# Patient Record
Sex: FEMALE | Race: WHITE | Employment: OTHER | URBAN - METROPOLITAN AREA
[De-identification: names, ages, dates, MRNs, and addresses within clinical notes are randomized per-mention and may not be internally consistent; named-entity substitution may affect disease eponyms.]

---

## 2021-10-21 ENCOUNTER — HOSPITAL ENCOUNTER (OUTPATIENT)
Dept: PREADMISSION TESTING | Age: 66
Discharge: HOME | End: 2021-10-25
Payer: COMMERCIAL

## 2021-10-21 VITALS
WEIGHT: 196 LBS | HEART RATE: 57 BPM | BODY MASS INDEX: 36.07 KG/M2 | SYSTOLIC BLOOD PRESSURE: 153 MMHG | HEIGHT: 62 IN | RESPIRATION RATE: 16 BRPM | DIASTOLIC BLOOD PRESSURE: 83 MMHG | OXYGEN SATURATION: 98 % | TEMPERATURE: 97.2 F

## 2021-10-21 LAB
-: NORMAL
ABO/RH: NORMAL
AMORPHOUS: NORMAL
ANION GAP SERPL CALCULATED.3IONS-SCNC: 11 MMOL/L (ref 9–17)
ANTIBODY SCREEN: NEGATIVE
ARM BAND NUMBER: NORMAL
BACTERIA: NORMAL
BILIRUBIN URINE: NEGATIVE
BUN BLDV-MCNC: 7 MG/DL (ref 8–23)
BUN/CREAT BLD: 13 (ref 9–20)
CALCIUM SERPL-MCNC: 9.2 MG/DL (ref 8.6–10.4)
CASTS UA: NORMAL /LPF
CHLORIDE BLD-SCNC: 100 MMOL/L (ref 98–107)
CO2: 29 MMOL/L (ref 20–31)
COLOR: YELLOW
COMMENT UA: ABNORMAL
CREAT SERPL-MCNC: 0.52 MG/DL (ref 0.5–0.9)
CRYSTALS, UA: NORMAL /HPF
EKG ATRIAL RATE: 57 BPM
EKG P AXIS: 79 DEGREES
EKG P-R INTERVAL: 164 MS
EKG Q-T INTERVAL: 492 MS
EKG QRS DURATION: 82 MS
EKG QTC CALCULATION (BAZETT): 478 MS
EKG R AXIS: 50 DEGREES
EKG T AXIS: 60 DEGREES
EKG VENTRICULAR RATE: 57 BPM
EPITHELIAL CELLS UA: NORMAL /HPF (ref 0–5)
EXPIRATION DATE: NORMAL
GFR AFRICAN AMERICAN: >60 ML/MIN
GFR NON-AFRICAN AMERICAN: >60 ML/MIN
GFR SERPL CREATININE-BSD FRML MDRD: ABNORMAL ML/MIN/{1.73_M2}
GFR SERPL CREATININE-BSD FRML MDRD: ABNORMAL ML/MIN/{1.73_M2}
GLUCOSE BLD-MCNC: 100 MG/DL (ref 70–99)
GLUCOSE URINE: NEGATIVE
HCT VFR BLD CALC: 46.8 % (ref 36.3–47.1)
HEMOGLOBIN: 15.2 G/DL (ref 11.9–15.1)
KETONES, URINE: NEGATIVE
LEUKOCYTE ESTERASE, URINE: ABNORMAL
MCH RBC QN AUTO: 33.2 PG (ref 25.2–33.5)
MCHC RBC AUTO-ENTMCNC: 32.5 G/DL (ref 28.4–34.8)
MCV RBC AUTO: 102.2 FL (ref 82.6–102.9)
MUCUS: NORMAL
NITRITE, URINE: NEGATIVE
NRBC AUTOMATED: 0 PER 100 WBC
OTHER OBSERVATIONS UA: NORMAL
PDW BLD-RTO: 12.2 % (ref 11.8–14.4)
PH UA: 6 (ref 5–8)
PLATELET # BLD: 289 K/UL (ref 138–453)
PMV BLD AUTO: 9.3 FL (ref 8.1–13.5)
POTASSIUM SERPL-SCNC: 4.7 MMOL/L (ref 3.7–5.3)
PROTEIN UA: NEGATIVE
RBC # BLD: 4.58 M/UL (ref 3.95–5.11)
RBC UA: NORMAL /HPF (ref 0–2)
RENAL EPITHELIAL, UA: NORMAL /HPF
SEDIMENTATION RATE, ERYTHROCYTE: <1 MM (ref 0–30)
SODIUM BLD-SCNC: 140 MMOL/L (ref 135–144)
SPECIFIC GRAVITY UA: 1.02 (ref 1–1.03)
TRICHOMONAS: NORMAL
TURBIDITY: ABNORMAL
URINE HGB: NEGATIVE
UROBILINOGEN, URINE: NORMAL
WBC # BLD: 5.9 K/UL (ref 3.5–11.3)
WBC UA: NORMAL /HPF (ref 0–5)
YEAST: NORMAL

## 2021-10-21 PROCEDURE — 87641 MR-STAPH DNA AMP PROBE: CPT

## 2021-10-21 PROCEDURE — 85027 COMPLETE CBC AUTOMATED: CPT

## 2021-10-21 PROCEDURE — 86850 RBC ANTIBODY SCREEN: CPT

## 2021-10-21 PROCEDURE — 86901 BLOOD TYPING SEROLOGIC RH(D): CPT

## 2021-10-21 PROCEDURE — 87086 URINE CULTURE/COLONY COUNT: CPT

## 2021-10-21 PROCEDURE — 93010 ELECTROCARDIOGRAM REPORT: CPT | Performed by: INTERNAL MEDICINE

## 2021-10-21 PROCEDURE — 93005 ELECTROCARDIOGRAM TRACING: CPT | Performed by: ORTHOPAEDIC SURGERY

## 2021-10-21 PROCEDURE — 85652 RBC SED RATE AUTOMATED: CPT

## 2021-10-21 PROCEDURE — 81001 URINALYSIS AUTO W/SCOPE: CPT

## 2021-10-21 PROCEDURE — 80048 BASIC METABOLIC PNL TOTAL CA: CPT

## 2021-10-21 PROCEDURE — 86900 BLOOD TYPING SEROLOGIC ABO: CPT

## 2021-10-21 PROCEDURE — 36415 COLL VENOUS BLD VENIPUNCTURE: CPT

## 2021-10-21 RX ORDER — FUROSEMIDE 20 MG/1
20 TABLET ORAL DAILY PRN
COMMUNITY
Start: 2021-10-05

## 2021-10-21 RX ORDER — SOTALOL HYDROCHLORIDE 120 MG/1
120 TABLET ORAL 2 TIMES DAILY
COMMUNITY
Start: 2021-10-20

## 2021-10-21 RX ORDER — BUPROPION HYDROCHLORIDE 150 MG/1
150 TABLET ORAL EVERY MORNING
COMMUNITY
Start: 2021-10-06

## 2021-10-21 RX ORDER — GABAPENTIN 300 MG/1
300 CAPSULE ORAL ONCE
Status: CANCELLED | OUTPATIENT
Start: 2021-11-15

## 2021-10-21 RX ORDER — VENLAFAXINE HYDROCHLORIDE 75 MG/1
75 CAPSULE, EXTENDED RELEASE ORAL DAILY
COMMUNITY
Start: 2021-08-03

## 2021-10-21 RX ORDER — OMEPRAZOLE 20 MG/1
20 CAPSULE, DELAYED RELEASE ORAL DAILY
COMMUNITY

## 2021-10-21 RX ORDER — NAPROXEN 500 MG/1
500 TABLET ORAL PRN
Status: ON HOLD | COMMUNITY
End: 2021-11-15

## 2021-10-21 RX ORDER — ALBUTEROL SULFATE 90 UG/1
2 AEROSOL, METERED RESPIRATORY (INHALATION) EVERY 4 HOURS PRN
COMMUNITY
Start: 2021-10-05 | End: 2022-10-05

## 2021-10-21 RX ORDER — LEVOTHYROXINE SODIUM 0.05 MG/1
50 TABLET ORAL DAILY
COMMUNITY
Start: 2021-04-29

## 2021-10-21 RX ORDER — ASPIRIN 325 MG
325 TABLET ORAL DAILY
Status: ON HOLD | COMMUNITY
End: 2021-11-16 | Stop reason: HOSPADM

## 2021-10-21 RX ORDER — DILTIAZEM HYDROCHLORIDE 180 MG/1
180 CAPSULE, EXTENDED RELEASE ORAL DAILY
COMMUNITY
Start: 2021-10-20

## 2021-10-21 RX ORDER — CELECOXIB 200 MG/1
200 CAPSULE ORAL ONCE
Status: CANCELLED | OUTPATIENT
Start: 2021-11-15

## 2021-10-21 RX ORDER — ACETAMINOPHEN 500 MG
1000 TABLET ORAL ONCE
Status: CANCELLED | OUTPATIENT
Start: 2021-11-15

## 2021-10-21 ASSESSMENT — PROMIS GLOBAL HEALTH SCALE
IN GENERAL, PLEASE RATE HOW WELL YOU CARRY OUT YOUR USUAL SOCIAL ACTIVITIES (INCLUDES ACTIVITIES AT HOME, AT WORK, AND IN YOUR COMMUNITY, AND RESPONSIBILITIES AS A PARENT, CHILD, SPOUSE, EMPLOYEE, FRIEND, ETC) [ON A SCALE OF 1 (POOR) TO 5 (EXCELLENT)]?: 4
TO WHAT EXTENT ARE YOU ABLE TO CARRY OUT YOUR EVERYDAY PHYSICAL ACTIVITIES SUCH AS WALKING, CLIMBING STAIRS, CARRYING GROCERIES, OR MOVING A CHAIR [ON A SCALE OF 1 (NOT AT ALL) TO 5 (COMPLETELY)]?: 3
IN GENERAL, HOW WOULD YOU RATE YOUR SATISFACTION WITH YOUR SOCIAL ACTIVITIES AND RELATIONSHIPS [ON A SCALE OF 1 (POOR) TO 5 (EXCELLENT)]?: 4
IN THE PAST 7 DAYS, HOW OFTEN HAVE YOU BEEN BOTHERED BY EMOTIONAL PROBLEMS, SUCH AS FEELING ANXIOUS, DEPRESSED, OR IRRITABLE [ON A SCALE FROM 1 (NEVER) TO 5 (ALWAYS)]?: 2
IN GENERAL, HOW WOULD YOU RATE YOUR MENTAL HEALTH, INCLUDING YOUR MOOD AND YOUR ABILITY TO THINK [ON A SCALE OF 1 (POOR) TO 5 (EXCELLENT)]?: 4
IN THE PAST 7 DAYS, HOW WOULD YOU RATE YOUR FATIGUE ON AVERAGE [ON A SCALE FROM 1 (NONE) TO 5 (VERY SEVERE)]?: 2
SUM OF RESPONSES TO QUESTIONS 2, 4, 5, & 10: 14
HOW IS THE PROMIS V1.1 BEING ADMINISTERED?: 0
IN GENERAL, WOULD YOU SAY YOUR HEALTH IS...[ON A SCALE OF 1 (POOR) TO 5 (EXCELLENT)]: 3
IN GENERAL, WOULD YOU SAY YOUR QUALITY OF LIFE IS...[ON A SCALE OF 1 (POOR) TO 5 (EXCELLENT)]: 4
SUM OF RESPONSES TO QUESTIONS 3, 6, 7, & 8: 15
IN GENERAL, HOW WOULD YOU RATE YOUR PHYSICAL HEALTH [ON A SCALE OF 1 (POOR) TO 5 (EXCELLENT)]?: 2
IN THE PAST 7 DAYS, HOW WOULD YOU RATE YOUR PAIN ON AVERAGE [ON A SCALE FROM 0 (NO PAIN) TO 10 (WORST IMAGINABLE PAIN)]?: 8
WHO IS THE PERSON COMPLETING THE PROMIS V1.1 SURVEY?: 0

## 2021-10-21 ASSESSMENT — PAIN DESCRIPTION - DESCRIPTORS: DESCRIPTORS: CONSTANT;DISCOMFORT

## 2021-10-21 ASSESSMENT — PAIN - FUNCTIONAL ASSESSMENT: PAIN_FUNCTIONAL_ASSESSMENT: PREVENTS OR INTERFERES SOME ACTIVE ACTIVITIES AND ADLS

## 2021-10-21 ASSESSMENT — KOOS JR
STANDING UPRIGHT: 2
TWISING OR PIVOTING ON KNEE: 3
HOW SEVERE IS YOUR KNEE STIFFNESS AFTER FIRST WAKING IN MORNING: 3
STRAIGHTENING KNEE FULLY: 2
RISING FROM SITTING: 3
GOING UP OR DOWN STAIRS: 3
BENDING TO THE FLOOR TO PICK UP OBJECT: 2

## 2021-10-21 ASSESSMENT — PAIN DESCRIPTION - FREQUENCY: FREQUENCY: CONTINUOUS

## 2021-10-21 ASSESSMENT — PAIN DESCRIPTION - LOCATION: LOCATION: KNEE

## 2021-10-21 ASSESSMENT — PAIN DESCRIPTION - ONSET: ONSET: ON-GOING

## 2021-10-21 ASSESSMENT — PAIN SCALES - GENERAL: PAINLEVEL_OUTOF10: 8

## 2021-10-21 ASSESSMENT — PAIN DESCRIPTION - PAIN TYPE: TYPE: CHRONIC PAIN

## 2021-10-21 ASSESSMENT — PAIN DESCRIPTION - ORIENTATION: ORIENTATION: RIGHT

## 2021-10-21 ASSESSMENT — PAIN DESCRIPTION - PROGRESSION: CLINICAL_PROGRESSION: NOT CHANGED

## 2021-10-21 NOTE — PRE-PROCEDURE INSTRUCTIONS
ARR[facility]  [facility] Monday, November 15, 2021 at 5\"30 AM    Once you enter the hospital lobby, take the elevators to the second floor. Check-In is at the surgery registration desk      Continue to take your home medications as you normally do up to and including the night before surgery with the exception of any blood thinning medications. Please stop any blood thinning medications as directed by your surgeon or prescribing physician. Failure to stop certain medications may interfere with your scheduled surgery. These may include:  Aspirin, Warfarin (Coumadin), Clopidogrel (Plavix), Ibuprofen (Motrin, Advil), Naproxen (Aleve), Meloxicam (Mobic), Celecoxib (Celebrex), Eliquis, Pradaxa, Xarelto, Effient, Fish Oil, Herbal supplements. Aspirin, Naprosyn, Omega 3        Please take the following medication(s) the day of surgery with a small sip of water: levothyroxine, omeprazole, albuterol if needed, sotalol, dilltiazem      Please use your inhaler(s) if needed and bring your inhaler(s) from home the day of surgery. PREPARING FOR YOUR SURGERY:     Before surgery, you can play an important role in your own health. Because skin is not sterile, we need to be sure that your skin is as free of germs as possible before surgery by carefully washing before surgery. Preparing or prepping skin before surgery can reduce the risk of a surgical site infection.   Do not shave the area of your body where your surgery will be performed unless you received specific permission from your physician. You will need to shower at home the night before surgery and the morning of surgery with a special soap called chlorhexidine gluconate (CHG*). *Not to be used by people allergic to Chlorhexidine Gluconate (CHG). Following these instructions will help you be sure that your skin is clean before surgery. Instructions on cleaning your skin before surgery:      The night before your surgery:      You will need to shower with warm water (not hot) and the CHG soap.  Use a clean wash cloth and a clean towel. Have clean clothes available to put on after the shower.   First wash your hair with regular shampoo. Rinse your hair and body thoroughly to remove the shampoo.  Wash your face and genital area (private parts) with your regular soap or water only. Thoroughly rinse your body with warm water from the neck down.  Turn water off to prevent rinsing the soap off too soon.  With a clean wet washcloth and half of the CHG soap in the bottle, lather your entire body from the neck down. Do not use CHG soap near your eyes or ears to avoid injury to those areas.  Wash thoroughly, paying special attention to the area where your surgery will be performed.  Wash your body gently for five (5) minutes. Avoid scrubbing your skin too hard.  Turn the water back on and rinse your body thoroughly.  Pat yourself dry with a clean, soft towel. Do not apply lotion, cream or powder.  Dress with clean freshly washed clothes. The morning of surgery:     Repeat shower following steps above - using remaining half of CHG soap in bottle. Patient Instructions:    Troy Jackson If you are having any type of anesthesia you are to have nothing to eat or drink after midnight the night before your surgery. This includes gum, hard candy, mints, water or smoking or chewing tobacco.  The only exception to this is a small sip of water to take with any morning dose of heart, blood pressure, or seizure medications. No alcoholic beverages for 24 hours prior to surgery.  Brush your teeth but do not swallow water.  Bring your eye glasses and case with you. No contacts are to be worn the day of surgery. You also may bring your hearing aids. Most surgical procedures involving anesthesia will require that you remove your dentures prior to surgery. · Do not wear any jewelry or body piercings day of surgery.   Also, NO lotion, perfume or deodorant to be used the day of surgery. No nail polish on the operative extremity (arm/leg surgeries)    · If you are staying overnight with us, please bring a small bag of necessary personal items.  Please wear loose, comfortable clothing. If you are potentially going to have a cast or brace bring clothing that will fit over them.  In case of illness  If you have cold or flu like symptoms (high fever, runny nose, sore throat, cough, etc.) rash, nausea, vomiting, loose stools, and/or recent contact with someone who has a contagious disease (chicken pox, measles, etc.) Please call your doctor before coming to the hospital.         Day of Surgery/Procedure:    As a patient at [facility] - INPATIENT you can expect quality medical and nursing care that is centered on your individual needs. Our goal is to make your surgical experience as comfortable as possible    . Transportation After Your Surgery/Procedure: You will need a friend or family member to drive you home after your procedure. Your  must be 25years of age or older and able to sign off on your discharge instructions. A taxi cab or any other form of public transportation is not acceptable. Your friend or family member must stay at the hospital throughout your procedure. Someone must remain with you for the first 24 hours after your surgery if you receive anesthesia or medication. If you do not have someone to stay with you, your procedure may be cancelled.       If you have any other questions regarding your procedure or the day of surgery, please call [phone]      _________________________  ____________________________  Signature (Patient)              Signature (Provider)               Date

## 2021-10-21 NOTE — H&P (VIEW-ONLY)
History and Physical Service   [facility]    PRE ADMISSION EVALUATION JOINT            Date of Evaluation: 10/21/2021  Patient name:  Demarco Williamson  MRN:   [id number]  YOB: 1955  PCP:    Kali Torrez MD    History Obtained From:     Patient    History of Present Illness: This is Demarco Williamson a 78 y.o. female who presents for a pre-admission testing appointment for an upcoming right total knee arthroplasty by Dr. Moi Escobedo scheduled on 11/15/2021 at 0730 due to right knee OA. The patient's chief complaint is 4-9/10 right knee pain which has progressively worsened over the past 2 years. The pain is aggravated by activity; and is minimally relieved with Tylenol Arthritis. The right knee gives out, clicks, and swells. Prior treatment includes right knee injections and physical therapy which improved the right knee pain. Denies recent falls and injuries. Sleep apnea questionnaire  1) Do you snore loudly? Yes  2) Do you often feel tired, fatigued, or sleepy? No  3) Has anyone observed you stop breathing or choking/gasping during your sleep? No  4) Do you have hypertension? No  5) BMI >35 kg/m2? Yes  6) Age > 48? Yes  7) Pt is a male? No    Functional Capacity:   1) Pt is not able to walk 2 city blocks on level ground without SOB. Pt denies dyspnea while push mowing her lawn. 2) Pt is not able to climb 2 flights of stairs without SOB. 3) Pt is not able to walk up a hill for 1-2 city blocks without SOB.     Past Medical History:     Past Medical History:   Diagnosis Date    A-fib ([address]     Asthma     GERD (gastroesophageal reflux disease)     Thyroid disease         Past Surgical History:     Past Surgical History:   Procedure Laterality Date    BLADDER SUSPENSION      BREAST BIOPSY      BREAST SURGERY Bilateral     augment    CARDIAC SURGERY      valve repair    CARDIOVERSION      COLONOSCOPY      HYSTERECTOMY      SHOULDER SURGERY Right     TONSILLECTOMY Medications Prior to Admission:     Prior to Admission medications    Medication Sig Start Date End Date Taking? Authorizing Provider   venlafaxine (EFFEXOR XR) 75 MG extended release capsule Take 150 mg by mouth daily  8/3/21  Yes Historical Provider, MD   buPROPion (WELLBUTRIN XL) 150 MG extended release tablet Take 150 mg by mouth every morning  10/6/21  Yes Historical Provider, MD   dilTIAZem [facility]) 180 MG extended release capsule Take 180 mg by mouth daily 10/20/21  Yes Historical Provider, MD   furosemide (LASIX) 20 MG tablet Take 20 mg by mouth daily as needed 10/5/21  Yes Historical Provider, MD   levothyroxine (SYNTHROID) 50 MCG tablet Take 50 mcg by mouth Daily  4/29/21  Yes Historical Provider, MD   sotalol (BETAPACE) 120 MG tablet Take 120 mg by mouth 2 times daily 10/20/21  Yes Historical Provider, MD   albuterol sulfate HFA (PROAIR HFA) 108 (90 Base) MCG/ACT inhaler Inhale 2 puffs into the lungs every 4 hours as needed 10/5/21 10/5/22 Yes Historical Provider, MD   omeprazole (PRILOSEC) 20 MG delayed release capsule Take 20 mg by mouth daily   Yes Historical Provider, MD   aspirin 81 MG EC tablet Take 81 mg by mouth daily   Yes Historical Provider, MD   Multiple Vitamins-Minerals (ONE-A-DAY WOMENS PO) Take 1 tablet by mouth daily   Yes Historical Provider, MD   naproxen (NAPROSYN) 500 MG tablet Take 500 mg by mouth as needed for Pain   Yes Historical Provider, MD   mupirocin (BACTROBAN) 2 % ointment APPLY TO EACH NOSTRIL WITH A EVERY-TIP TWICE DAILY STARTING 5 DAYS PRIOR TO SUGERY 10/6/21   Historical Provider, MD        Allergies:     Patient has no known allergies. Social History:     Tobacco:    reports that she has been smoking cigarettes. She has been smoking about 0.50 packs per day. She has never used smokeless tobacco.  Alcohol:      reports current alcohol use. Drug Use:  reports current drug use. Drug: Marijuana. Instructed pt to avoid marijuana prior to surgery.  Pt voiced understanding. Family History:     History reviewed. No pertinent family history. Review of Systems:     Positive and Negative as described in HPI. CONSTITUTIONAL: Negative for fevers, chills, sweats, fatigue, and weight loss. HEENT: Pt wears reading glasses. Clear rhinorrhea. Negative for hearing changes and throat pain. RESPIRATORY: History of asthma. Pt uses an albuterol inhaler once every other month. Dyspnea with exertion. Chronic productive cough with clear sputum. Occasional wheezing. Negative for congestion. CARDIOVASCULAR: History of A-fib. S/p cardioversion. S/p cardiac valve repair when the pt was 3 years ago. Pt follows-up with cardiology at [address] in [place], [place]. Negative for chest pain, blood clot, and palpitations. GASTROINTESTINAL: GERD. Pt takes Prilosec. Negative for nausea, vomiting, diarrhea, constipation, change in bowel habits, and abdominal pain. GENITOURINARY: Negative for difficulty of urination, burning with urination, and frequency. INTEGUMENT: Easy bruising. Negative for rash and skin lesions. Instructed pt to call Dr. Neal Dress as soon as possible if a rash or wound develops prior to surgery. Pt voiced understanding. HEMATOLOGIC/LYMPHATIC: Bilateral knee edema. Pt states she has bilateral ankle edema during the summer. ALLERGIC/IMMUNOLOGIC: Negative for urticaria and itching. ENDOCRINE: Thyroid disease- managed by Dr. Beatriz Gonzalez. Negative for increase in thirst, increase in urination, and heat or cold intolerance. MUSCULOSKELETAL: See HPI. Left knee pain. NEUROLOGICAL: Negative for headaches, dizziness, lightheadedness, numbness, and tingling extremities. BEHAVIOR/PSYCH: Treated depression. Negative for anxiety. Physical Exam:   BP (!) 153/83   Pulse 57   Temp 97.2 °F (36.2 °C) (Temporal)   Resp 16   Ht 5' 2\" (1.575 m)   Wt 196 lb (88.9 kg)   SpO2 98%   BMI 35.85 kg/m²   No LMP recorded. Patient has had a hysterectomy.   No obstetric history on file. No results for input(s): POCGLU in the last 72 hours. General Appearance:  Alert, well appearing, and in no acute distress. Mental status: Oriented to person, place, and time. Head: Normocephalic and atraumatic. Eye: No icterus, redness, pupils equal and reactive, extraocular eye movements intact, and conjunctiva clear. Ear: Hearing grossly intact. Nose: No drainage noted. Mouth: Mucous membranes moist.  Neck: Supple and no carotid bruits noted. Lungs: Diminished throughout bilateral lungs. Bilateral equal air entry, no wheezing, rales or rhonchi, and normal effort. Cardiovascular: Asymptomatic bradycardia. HR 57 BPM. Regular rhythm. No murmur, gallop, or rub. Abdomen: Soft, non-tender, non-distended, and active bowel sounds. Neurologic: Normal speech and cranial nerves II through XII grossly intact. Strength 5/5 bilaterally. Skin: No gross lesions, rashes, bruising, or bleeding on exposed skin area. Extremities: No knee tenderness. Posterior tibial pulses are palpable bilaterally. No ankle edema. No calf tenderness with palpation. Psych: Normal affect. Investigations:      Laboratory Testing:  Recent Results (from the past 24 hour(s))   CBC    Collection Time: 10/21/21 11:13 AM   Result Value Ref Range    WBC 5.9 3.5 - 11.3 k/uL    RBC 4.58 3.95 - 5.11 m/uL    Hemoglobin 15.2 (H) 11.9 - 15.1 g/dL    Hematocrit 46.8 36.3 - 47.1 %    .2 82.6 - 102.9 fL    MCH 33.2 25.2 - 33.5 pg    MCHC 32.5 28.4 - 34.8 g/dL    RDW 12.2 11.8 - 14.4 %    Platelets 279 207 - 415 k/uL    MPV 9.3 8.1 - 13.5 fL    NRBC Automated 0.0 0.0 per 100 WBC   Sedimentation Rate    Collection Time: 10/21/21 11:13 AM   Result Value Ref Range    Sed Rate <1 0 - 30 mm       Recent Labs     10/21/21  1113   HGB 15.2*   HCT 46.8   WBC 5.9   .2       No results for input(s): COVID19 in the last 720 hours. EKG: 10/21/2021. See Epic. Diagnosis:      1. Right knee OA    Plans:     1.  Right total knee yanelis Knight, MICHAEL - CNP  10/21/2021  12:02 PM

## 2021-10-21 NOTE — H&P
History and Physical Service   [facility]    PRE ADMISSION EVALUATION JOINT            Date of Evaluation: 10/21/2021  Patient name:  Morena Garcia  MRN:   [id number]  YOB: 1955  PCP:    Delfina Edmonds MD    History Obtained From:     Patient    History of Present Illness: This is Morena Garcia a 78 y.o. female who presents for a pre-admission testing appointment for an upcoming right total knee arthroplasty by Dr. Mag Pierson scheduled on 11/15/2021 at 0730 due to right knee OA. The patient's chief complaint is 4-9/10 right knee pain which has progressively worsened over the past 2 years. The pain is aggravated by activity; and is minimally relieved with Tylenol Arthritis. The right knee gives out, clicks, and swells. Prior treatment includes right knee injections and physical therapy which improved the right knee pain. Denies recent falls and injuries. Sleep apnea questionnaire  1) Do you snore loudly? Yes  2) Do you often feel tired, fatigued, or sleepy? No  3) Has anyone observed you stop breathing or choking/gasping during your sleep? No  4) Do you have hypertension? No  5) BMI >35 kg/m2? Yes  6) Age > 48? Yes  7) Pt is a male? No    Functional Capacity:   1) Pt is not able to walk 2 city blocks on level ground without SOB. Pt denies dyspnea while push mowing her lawn. 2) Pt is not able to climb 2 flights of stairs without SOB. 3) Pt is not able to walk up a hill for 1-2 city blocks without SOB.     Past Medical History:     Past Medical History:   Diagnosis Date    A-fib ([address]     Asthma     GERD (gastroesophageal reflux disease)     Thyroid disease         Past Surgical History:     Past Surgical History:   Procedure Laterality Date    BLADDER SUSPENSION      BREAST BIOPSY      BREAST SURGERY Bilateral     augment    CARDIAC SURGERY      valve repair    CARDIOVERSION      COLONOSCOPY      HYSTERECTOMY      SHOULDER SURGERY Right     TONSILLECTOMY understanding. Family History:     History reviewed. No pertinent family history. Review of Systems:     Positive and Negative as described in HPI. CONSTITUTIONAL: Negative for fevers, chills, sweats, fatigue, and weight loss. HEENT: Pt wears reading glasses. Clear rhinorrhea. Negative for hearing changes and throat pain. RESPIRATORY: History of asthma. Pt uses an albuterol inhaler once every other month. Dyspnea with exertion. Chronic productive cough with clear sputum. Occasional wheezing. Negative for congestion. CARDIOVASCULAR: History of A-fib. S/p cardioversion. S/p cardiac valve repair when the pt was 3 years ago. Pt follows-up with cardiology at [facility]- in [place], [place]. Negative for chest pain, blood clot, and palpitations. GASTROINTESTINAL: GERD. Pt takes Prilosec. Negative for nausea, vomiting, diarrhea, constipation, change in bowel habits, and abdominal pain. GENITOURINARY: Negative for difficulty of urination, burning with urination, and frequency. INTEGUMENT: Easy bruising. Negative for rash and skin lesions. Instructed pt to call Dr. Estrellita Pacheco as soon as possible if a rash or wound develops prior to surgery. Pt voiced understanding. HEMATOLOGIC/LYMPHATIC: Bilateral knee edema. Pt states she has bilateral ankle edema during the summer. ALLERGIC/IMMUNOLOGIC: Negative for urticaria and itching. ENDOCRINE: Thyroid disease- managed by Dr. Zee Blanco. Negative for increase in thirst, increase in urination, and heat or cold intolerance. MUSCULOSKELETAL: See HPI. Left knee pain. NEUROLOGICAL: Negative for headaches, dizziness, lightheadedness, numbness, and tingling extremities. BEHAVIOR/PSYCH: Treated depression. Negative for anxiety. Physical Exam:   BP (!) 153/83   Pulse 57   Temp 97.2 °F (36.2 °C) (Temporal)   Resp 16   Ht 5' 2\" (1.575 m)   Wt 196 lb (88.9 kg)   SpO2 98%   BMI 35.85 kg/m²   No LMP recorded. Patient has had a hysterectomy.   No obstetric history on file. No results for input(s): POCGLU in the last 72 hours. General Appearance:  Alert, well appearing, and in no acute distress. Mental status: Oriented to person, place, and time. Head: Normocephalic and atraumatic. Eye: No icterus, redness, pupils equal and reactive, extraocular eye movements intact, and conjunctiva clear. Ear: Hearing grossly intact. Nose: No drainage noted. Mouth: Mucous membranes moist.  Neck: Supple and no carotid bruits noted. Lungs: Diminished throughout bilateral lungs. Bilateral equal air entry, no wheezing, rales or rhonchi, and normal effort. Cardiovascular: Asymptomatic bradycardia. HR 57 BPM. Regular rhythm. No murmur, gallop, or rub. Abdomen: Soft, non-tender, non-distended, and active bowel sounds. Neurologic: Normal speech and cranial nerves II through XII grossly intact. Strength 5/5 bilaterally. Skin: No gross lesions, rashes, bruising, or bleeding on exposed skin area. Extremities: No knee tenderness. Posterior tibial pulses are palpable bilaterally. No ankle edema. No calf tenderness with palpation. Psych: Normal affect. Investigations:      Laboratory Testing:  Recent Results (from the past 24 hour(s))   CBC    Collection Time: 10/21/21 11:13 AM   Result Value Ref Range    WBC 5.9 3.5 - 11.3 k/uL    RBC 4.58 3.95 - 5.11 m/uL    Hemoglobin 15.2 (H) 11.9 - 15.1 g/dL    Hematocrit 46.8 36.3 - 47.1 %    .2 82.6 - 102.9 fL    MCH 33.2 25.2 - 33.5 pg    MCHC 32.5 28.4 - 34.8 g/dL    RDW 12.2 11.8 - 14.4 %    Platelets 015 031 - 813 k/uL    MPV 9.3 8.1 - 13.5 fL    NRBC Automated 0.0 0.0 per 100 WBC   Sedimentation Rate    Collection Time: 10/21/21 11:13 AM   Result Value Ref Range    Sed Rate <1 0 - 30 mm       Recent Labs     10/21/21  1113   HGB 15.2*   HCT 46.8   WBC 5.9   .2       No results for input(s): COVID19 in the last 720 hours. EKG: 10/21/2021. See Epic. Diagnosis:      1. Right knee OA    Plans:     1.  Right total knee arthroplasty       [place] Dylan, MICHAEL - CNP  10/21/2021  12:02 PM

## 2021-10-22 LAB
CULTURE: NORMAL
Lab: NORMAL
MRSA, DNA, NASAL: NORMAL
SPECIMEN DESCRIPTION: NORMAL
SPECIMEN DESCRIPTION: NORMAL

## 2021-11-02 NOTE — PROGRESS NOTES
[facility]) Joint Replacement Pre-surgical Assessment    Scheduled Surgery Date: 11/15/2021  Surgery Time: 0730    Surgeon: Shy Wright  Procedure: right Total Knee    Primary Insurance Coverage [employer] OOS  Pre-op class attended YES    PCP: Patrick Moe MD  Clearance received by PCP: Yes    Anticipated Discharge Plan: home  Agency (if applicable): C    Significant PMH:   Surgical History    Procedure Laterality Date Comment Source   BLADDER SUSPENSION       BREAST BIOPSY       BREAST SURGERY Bilateral  augment    CARDIAC SURGERY   valve repair    CARDIOVERSION       COLONOSCOPY       HYSTERECTOMY       SHOULDER SURGERY Right      TONSILLECTOMY       ED Notes    ED Notes    Medical History    Diagnosis Date Comment Source   A-fib [facility])      Asthma      GERD (gastroesophageal reflux disease)      Thyroid disease               Smoking history: positive for approximately 0.5 packs per day. Patient advised to quit smoking    Alcohol history: Current alcohol use: DAILY    Concerns prior to surgery: PT WILL NEED A FWW. PT MET WITH PT/OT AFTER CLASS.     Electronically signed by: Angel Fragoso RN on 11/2/2021 at 9:57 AM

## 2021-11-12 ENCOUNTER — ANESTHESIA EVENT (OUTPATIENT)
Dept: OPERATING ROOM | Age: 66
End: 2021-11-12
Payer: COMMERCIAL

## 2021-11-15 ENCOUNTER — ANESTHESIA (OUTPATIENT)
Dept: OPERATING ROOM | Age: 66
End: 2021-11-15
Payer: COMMERCIAL

## 2021-11-15 ENCOUNTER — APPOINTMENT (OUTPATIENT)
Dept: GENERAL RADIOLOGY | Age: 66
End: 2021-11-15
Attending: ORTHOPAEDIC SURGERY
Payer: COMMERCIAL

## 2021-11-15 ENCOUNTER — HOSPITAL ENCOUNTER (OUTPATIENT)
Age: 66
Discharge: HOME | End: 2021-11-16
Attending: ORTHOPAEDIC SURGERY | Admitting: ORTHOPAEDIC SURGERY
Payer: COMMERCIAL

## 2021-11-15 VITALS — OXYGEN SATURATION: 94 % | SYSTOLIC BLOOD PRESSURE: 108 MMHG | TEMPERATURE: 97.3 F | DIASTOLIC BLOOD PRESSURE: 59 MMHG

## 2021-11-15 PROBLEM — M17.11 PRIMARY OSTEOARTHRITIS OF RIGHT KNEE: Chronic | Status: ACTIVE | Noted: 2021-11-15

## 2021-11-15 PROCEDURE — 6360000002 HC RX W HCPCS: Performed by: ORTHOPAEDIC SURGERY

## 2021-11-15 PROCEDURE — 97530 THERAPEUTIC ACTIVITIES: CPT

## 2021-11-15 PROCEDURE — 2500000003 HC RX 250 WO HCPCS: Performed by: NURSE ANESTHETIST, CERTIFIED REGISTERED

## 2021-11-15 PROCEDURE — 3600000005 HC SURGERY LEVEL 5 BASE: Performed by: ORTHOPAEDIC SURGERY

## 2021-11-15 PROCEDURE — 64447 NJX AA&/STRD FEMORAL NRV IMG: CPT | Performed by: ANESTHESIOLOGY

## 2021-11-15 PROCEDURE — 3600000015 HC SURGERY LEVEL 5 ADDTL 15MIN: Performed by: ORTHOPAEDIC SURGERY

## 2021-11-15 PROCEDURE — 6360000002 HC RX W HCPCS: Performed by: ANESTHESIOLOGY

## 2021-11-15 PROCEDURE — 2580000003 HC RX 258: Performed by: ORTHOPAEDIC SURGERY

## 2021-11-15 PROCEDURE — 3700000001 HC ADD 15 MINUTES (ANESTHESIA): Performed by: ORTHOPAEDIC SURGERY

## 2021-11-15 PROCEDURE — C1776 JOINT DEVICE (IMPLANTABLE): HCPCS | Performed by: ORTHOPAEDIC SURGERY

## 2021-11-15 PROCEDURE — 97116 GAIT TRAINING THERAPY: CPT

## 2021-11-15 PROCEDURE — 2580000003 HC RX 258: Performed by: ANESTHESIOLOGY

## 2021-11-15 PROCEDURE — C9290 INJ, BUPIVACAINE LIPOSOME: HCPCS | Performed by: ANESTHESIOLOGY

## 2021-11-15 PROCEDURE — 97162 PT EVAL MOD COMPLEX 30 MIN: CPT

## 2021-11-15 PROCEDURE — 97166 OT EVAL MOD COMPLEX 45 MIN: CPT

## 2021-11-15 PROCEDURE — 6370000000 HC RX 637 (ALT 250 FOR IP): Performed by: ORTHOPAEDIC SURGERY

## 2021-11-15 PROCEDURE — 97535 SELF CARE MNGMENT TRAINING: CPT

## 2021-11-15 PROCEDURE — 7100000000 HC PACU RECOVERY - FIRST 15 MIN: Performed by: ORTHOPAEDIC SURGERY

## 2021-11-15 PROCEDURE — 6360000002 HC RX W HCPCS: Performed by: NURSE ANESTHETIST, CERTIFIED REGISTERED

## 2021-11-15 PROCEDURE — 2709999900 HC NON-CHARGEABLE SUPPLY: Performed by: ORTHOPAEDIC SURGERY

## 2021-11-15 PROCEDURE — C1713 ANCHOR/SCREW BN/BN,TIS/BN: HCPCS | Performed by: ORTHOPAEDIC SURGERY

## 2021-11-15 PROCEDURE — 73560 X-RAY EXAM OF KNEE 1 OR 2: CPT

## 2021-11-15 PROCEDURE — 2720000010 HC SURG SUPPLY STERILE: Performed by: ORTHOPAEDIC SURGERY

## 2021-11-15 PROCEDURE — 7100000001 HC PACU RECOVERY - ADDTL 15 MIN: Performed by: ORTHOPAEDIC SURGERY

## 2021-11-15 PROCEDURE — 2500000003 HC RX 250 WO HCPCS: Performed by: ANESTHESIOLOGY

## 2021-11-15 PROCEDURE — 6370000000 HC RX 637 (ALT 250 FOR IP): Performed by: ANESTHESIOLOGY

## 2021-11-15 PROCEDURE — 3700000000 HC ANESTHESIA ATTENDED CARE: Performed by: ORTHOPAEDIC SURGERY

## 2021-11-15 DEVICE — COMPONENT FEM SZ 5 R KNEE NAR POST STBL CEM ATTUNE: Type: IMPLANTABLE DEVICE | Site: KNEE | Status: FUNCTIONAL

## 2021-11-15 DEVICE — CEMENT BNE 40GM FULL DOSE PMMA W/O ANTIBIO HI VISC N RADPQ: Type: IMPLANTABLE DEVICE | Site: KNEE | Status: FUNCTIONAL

## 2021-11-15 DEVICE — BASEPLATE TIB SZ 5 FIX BEAR CEM S+ TECHNOLOGY ATTUNE: Type: IMPLANTABLE DEVICE | Site: KNEE | Status: FUNCTIONAL

## 2021-11-15 DEVICE — UPCHARGE KNEE PRIMARY ATTUNE AOX INSERT DEPUY SYNTHES: Type: IMPLANTABLE DEVICE | Site: KNEE | Status: FUNCTIONAL

## 2021-11-15 DEVICE — COMPONENT PAT DIA35MM KNEE POLY CEM MEDIALIZED ANAT ATTUNE: Type: IMPLANTABLE DEVICE | Site: KNEE | Status: FUNCTIONAL

## 2021-11-15 DEVICE — IMPLANTABLE DEVICE: Type: IMPLANTABLE DEVICE | Site: KNEE | Status: FUNCTIONAL

## 2021-11-15 RX ORDER — SODIUM CHLORIDE 9 MG/ML
25 INJECTION, SOLUTION INTRAVENOUS PRN
Status: DISCONTINUED | OUTPATIENT
Start: 2021-11-15 | End: 2021-11-16 | Stop reason: HOSPADM

## 2021-11-15 RX ORDER — FENTANYL CITRATE 50 UG/ML
INJECTION, SOLUTION INTRAMUSCULAR; INTRAVENOUS PRN
Status: DISCONTINUED | OUTPATIENT
Start: 2021-11-15 | End: 2021-11-15 | Stop reason: SDUPTHER

## 2021-11-15 RX ORDER — PROMETHAZINE HYDROCHLORIDE 25 MG/ML
6.25 INJECTION, SOLUTION INTRAMUSCULAR; INTRAVENOUS
Status: DISCONTINUED | OUTPATIENT
Start: 2021-11-15 | End: 2021-11-15 | Stop reason: HOSPADM

## 2021-11-15 RX ORDER — SODIUM CHLORIDE 9 MG/ML
INJECTION, SOLUTION INTRAVENOUS CONTINUOUS
Status: DISCONTINUED | OUTPATIENT
Start: 2021-11-16 | End: 2021-11-15

## 2021-11-15 RX ORDER — OXYCODONE HYDROCHLORIDE 5 MG/1
5 TABLET ORAL EVERY 4 HOURS PRN
Status: DISCONTINUED | OUTPATIENT
Start: 2021-11-15 | End: 2021-11-16 | Stop reason: HOSPADM

## 2021-11-15 RX ORDER — VENLAFAXINE HYDROCHLORIDE 75 MG/1
150 CAPSULE, EXTENDED RELEASE ORAL DAILY
Status: DISCONTINUED | OUTPATIENT
Start: 2021-11-15 | End: 2021-11-15

## 2021-11-15 RX ORDER — FENTANYL CITRATE 50 UG/ML
50 INJECTION, SOLUTION INTRAMUSCULAR; INTRAVENOUS EVERY 5 MIN PRN
Status: DISCONTINUED | OUTPATIENT
Start: 2021-11-15 | End: 2021-11-15 | Stop reason: HOSPADM

## 2021-11-15 RX ORDER — KETAMINE HCL IN NACL, ISO-OSM 100MG/10ML
SYRINGE (ML) INJECTION PRN
Status: DISCONTINUED | OUTPATIENT
Start: 2021-11-15 | End: 2021-11-15 | Stop reason: SDUPTHER

## 2021-11-15 RX ORDER — ONDANSETRON 2 MG/ML
INJECTION INTRAMUSCULAR; INTRAVENOUS PRN
Status: DISCONTINUED | OUTPATIENT
Start: 2021-11-15 | End: 2021-11-15 | Stop reason: SDUPTHER

## 2021-11-15 RX ORDER — POLYETHYLENE GLYCOL 3350 17 G/17G
17 POWDER, FOR SOLUTION ORAL DAILY
Status: DISCONTINUED | OUTPATIENT
Start: 2021-11-15 | End: 2021-11-16 | Stop reason: HOSPADM

## 2021-11-15 RX ORDER — MIDAZOLAM HYDROCHLORIDE 1 MG/ML
2 INJECTION INTRAMUSCULAR; INTRAVENOUS ONCE
Status: DISCONTINUED | OUTPATIENT
Start: 2021-11-15 | End: 2021-11-15

## 2021-11-15 RX ORDER — HYDROMORPHONE HCL 110MG/55ML
PATIENT CONTROLLED ANALGESIA SYRINGE INTRAVENOUS PRN
Status: DISCONTINUED | OUTPATIENT
Start: 2021-11-15 | End: 2021-11-15 | Stop reason: SDUPTHER

## 2021-11-15 RX ORDER — SODIUM CHLORIDE 0.9 % (FLUSH) 0.9 %
5-40 SYRINGE (ML) INJECTION PRN
Status: DISCONTINUED | OUTPATIENT
Start: 2021-11-15 | End: 2021-11-16 | Stop reason: HOSPADM

## 2021-11-15 RX ORDER — TRANEXAMIC ACID 100 MG/ML
INJECTION, SOLUTION INTRAVENOUS
Status: COMPLETED
Start: 2021-11-15 | End: 2021-11-15

## 2021-11-15 RX ORDER — ONDANSETRON 2 MG/ML
4 INJECTION INTRAMUSCULAR; INTRAVENOUS EVERY 6 HOURS PRN
Status: DISCONTINUED | OUTPATIENT
Start: 2021-11-15 | End: 2021-11-16 | Stop reason: HOSPADM

## 2021-11-15 RX ORDER — LIDOCAINE HYDROCHLORIDE 20 MG/ML
INJECTION, SOLUTION EPIDURAL; INFILTRATION; INTRACAUDAL; PERINEURAL PRN
Status: DISCONTINUED | OUTPATIENT
Start: 2021-11-15 | End: 2021-11-15 | Stop reason: SDUPTHER

## 2021-11-15 RX ORDER — ACETAMINOPHEN 500 MG
1000 TABLET ORAL ONCE
Status: COMPLETED | OUTPATIENT
Start: 2021-11-15 | End: 2021-11-15

## 2021-11-15 RX ORDER — HYDROCODONE BITARTRATE AND ACETAMINOPHEN 5; 325 MG/1; MG/1
2 TABLET ORAL PRN
Status: DISCONTINUED | OUTPATIENT
Start: 2021-11-15 | End: 2021-11-15 | Stop reason: HOSPADM

## 2021-11-15 RX ORDER — LIDOCAINE HYDROCHLORIDE 10 MG/ML
1 INJECTION, SOLUTION EPIDURAL; INFILTRATION; INTRACAUDAL; PERINEURAL
Status: DISCONTINUED | OUTPATIENT
Start: 2021-11-16 | End: 2021-11-15 | Stop reason: HOSPADM

## 2021-11-15 RX ORDER — OXYCODONE HYDROCHLORIDE 5 MG/1
10 TABLET ORAL EVERY 4 HOURS PRN
Status: DISCONTINUED | OUTPATIENT
Start: 2021-11-15 | End: 2021-11-16 | Stop reason: HOSPADM

## 2021-11-15 RX ORDER — MIDAZOLAM HYDROCHLORIDE 1 MG/ML
INJECTION INTRAMUSCULAR; INTRAVENOUS PRN
Status: DISCONTINUED | OUTPATIENT
Start: 2021-11-15 | End: 2021-11-15 | Stop reason: SDUPTHER

## 2021-11-15 RX ORDER — SODIUM CHLORIDE 0.9 % (FLUSH) 0.9 %
10 SYRINGE (ML) INJECTION EVERY 12 HOURS SCHEDULED
Status: DISCONTINUED | OUTPATIENT
Start: 2021-11-15 | End: 2021-11-15 | Stop reason: HOSPADM

## 2021-11-15 RX ORDER — NEOSTIGMINE METHYLSULFATE 5 MG/5 ML
SYRINGE (ML) INTRAVENOUS PRN
Status: DISCONTINUED | OUTPATIENT
Start: 2021-11-15 | End: 2021-11-15 | Stop reason: SDUPTHER

## 2021-11-15 RX ORDER — SODIUM CHLORIDE 0.9 % (FLUSH) 0.9 %
5-40 SYRINGE (ML) INJECTION EVERY 12 HOURS SCHEDULED
Status: DISCONTINUED | OUTPATIENT
Start: 2021-11-15 | End: 2021-11-16 | Stop reason: HOSPADM

## 2021-11-15 RX ORDER — ONDANSETRON 2 MG/ML
4 INJECTION INTRAMUSCULAR; INTRAVENOUS
Status: DISCONTINUED | OUTPATIENT
Start: 2021-11-15 | End: 2021-11-15 | Stop reason: HOSPADM

## 2021-11-15 RX ORDER — MORPHINE SULFATE 2 MG/ML
2 INJECTION, SOLUTION INTRAMUSCULAR; INTRAVENOUS
Status: DISCONTINUED | OUTPATIENT
Start: 2021-11-15 | End: 2021-11-16 | Stop reason: HOSPADM

## 2021-11-15 RX ORDER — LEVOTHYROXINE SODIUM 0.05 MG/1
50 TABLET ORAL DAILY
Status: DISCONTINUED | OUTPATIENT
Start: 2021-11-15 | End: 2021-11-16 | Stop reason: HOSPADM

## 2021-11-15 RX ORDER — SEVOFLURANE 250 ML/250ML
LIQUID RESPIRATORY (INHALATION)
Status: DISCONTINUED
Start: 2021-11-15 | End: 2021-11-15

## 2021-11-15 RX ORDER — EPHEDRINE SULFATE/0.9% NACL/PF 50 MG/5 ML
SYRINGE (ML) INTRAVENOUS PRN
Status: DISCONTINUED | OUTPATIENT
Start: 2021-11-15 | End: 2021-11-15 | Stop reason: SDUPTHER

## 2021-11-15 RX ORDER — ACETAMINOPHEN 325 MG/1
650 TABLET ORAL EVERY 6 HOURS
Status: DISCONTINUED | OUTPATIENT
Start: 2021-11-15 | End: 2021-11-16 | Stop reason: HOSPADM

## 2021-11-15 RX ORDER — SODIUM CHLORIDE, SODIUM LACTATE, POTASSIUM CHLORIDE, CALCIUM CHLORIDE 600; 310; 30; 20 MG/100ML; MG/100ML; MG/100ML; MG/100ML
INJECTION, SOLUTION INTRAVENOUS CONTINUOUS
Status: DISCONTINUED | OUTPATIENT
Start: 2021-11-15 | End: 2021-11-16 | Stop reason: HOSPADM

## 2021-11-15 RX ORDER — FUROSEMIDE 20 MG/1
20 TABLET ORAL DAILY
Status: DISCONTINUED | OUTPATIENT
Start: 2021-11-15 | End: 2021-11-16 | Stop reason: HOSPADM

## 2021-11-15 RX ORDER — OXYCODONE HYDROCHLORIDE AND ACETAMINOPHEN 5; 325 MG/1; MG/1
1 TABLET ORAL EVERY 4 HOURS PRN
COMMUNITY

## 2021-11-15 RX ORDER — M-VIT,TX,IRON,MINS/CALC/FOLIC 27MG-0.4MG
1 TABLET ORAL DAILY
Status: DISCONTINUED | OUTPATIENT
Start: 2021-11-15 | End: 2021-11-16 | Stop reason: HOSPADM

## 2021-11-15 RX ORDER — ONDANSETRON 4 MG/1
4 TABLET, ORALLY DISINTEGRATING ORAL EVERY 8 HOURS PRN
Status: DISCONTINUED | OUTPATIENT
Start: 2021-11-15 | End: 2021-11-16 | Stop reason: HOSPADM

## 2021-11-15 RX ORDER — SODIUM CHLORIDE 9 MG/ML
25 INJECTION, SOLUTION INTRAVENOUS PRN
Status: DISCONTINUED | OUTPATIENT
Start: 2021-11-15 | End: 2021-11-15 | Stop reason: HOSPADM

## 2021-11-15 RX ORDER — OMEPRAZOLE 20 MG/1
20 CAPSULE, DELAYED RELEASE ORAL DAILY
Status: DISCONTINUED | OUTPATIENT
Start: 2021-11-15 | End: 2021-11-15

## 2021-11-15 RX ORDER — HYDROCODONE BITARTRATE AND ACETAMINOPHEN 5; 325 MG/1; MG/1
1 TABLET ORAL PRN
Status: DISCONTINUED | OUTPATIENT
Start: 2021-11-15 | End: 2021-11-15 | Stop reason: HOSPADM

## 2021-11-15 RX ORDER — GABAPENTIN 300 MG/1
300 CAPSULE ORAL 3 TIMES DAILY
COMMUNITY

## 2021-11-15 RX ORDER — MORPHINE SULFATE 4 MG/ML
4 INJECTION, SOLUTION INTRAMUSCULAR; INTRAVENOUS
Status: DISCONTINUED | OUTPATIENT
Start: 2021-11-15 | End: 2021-11-16 | Stop reason: HOSPADM

## 2021-11-15 RX ORDER — SENNA AND DOCUSATE SODIUM 50; 8.6 MG/1; MG/1
1 TABLET, FILM COATED ORAL 2 TIMES DAILY
Status: DISCONTINUED | OUTPATIENT
Start: 2021-11-15 | End: 2021-11-16 | Stop reason: HOSPADM

## 2021-11-15 RX ORDER — BUPROPION HYDROCHLORIDE 150 MG/1
150 TABLET ORAL EVERY MORNING
Status: DISCONTINUED | OUTPATIENT
Start: 2021-11-16 | End: 2021-11-16 | Stop reason: HOSPADM

## 2021-11-15 RX ORDER — ALBUTEROL SULFATE 90 UG/1
2 AEROSOL, METERED RESPIRATORY (INHALATION) EVERY 4 HOURS PRN
Status: DISCONTINUED | OUTPATIENT
Start: 2021-11-15 | End: 2021-11-16 | Stop reason: HOSPADM

## 2021-11-15 RX ORDER — CELECOXIB 200 MG/1
200 CAPSULE ORAL ONCE
Status: DISCONTINUED | OUTPATIENT
Start: 2021-11-15 | End: 2021-11-15

## 2021-11-15 RX ORDER — VENLAFAXINE HYDROCHLORIDE 75 MG/1
75 CAPSULE, EXTENDED RELEASE ORAL DAILY
Status: DISCONTINUED | OUTPATIENT
Start: 2021-11-15 | End: 2021-11-16 | Stop reason: HOSPADM

## 2021-11-15 RX ORDER — GABAPENTIN 300 MG/1
300 CAPSULE ORAL ONCE
Status: DISCONTINUED | OUTPATIENT
Start: 2021-11-15 | End: 2021-11-15

## 2021-11-15 RX ORDER — TRANEXAMIC ACID 100 MG/ML
INJECTION, SOLUTION INTRAVENOUS PRN
Status: DISCONTINUED | OUTPATIENT
Start: 2021-11-15 | End: 2021-11-15 | Stop reason: SDUPTHER

## 2021-11-15 RX ORDER — FENTANYL CITRATE 50 UG/ML
25 INJECTION, SOLUTION INTRAMUSCULAR; INTRAVENOUS EVERY 5 MIN PRN
Status: DISCONTINUED | OUTPATIENT
Start: 2021-11-15 | End: 2021-11-15 | Stop reason: HOSPADM

## 2021-11-15 RX ORDER — SOTALOL HYDROCHLORIDE 80 MG/1
120 TABLET ORAL 2 TIMES DAILY
Status: DISCONTINUED | OUTPATIENT
Start: 2021-11-15 | End: 2021-11-16 | Stop reason: HOSPADM

## 2021-11-15 RX ORDER — SODIUM CHLORIDE 0.9 % (FLUSH) 0.9 %
10 SYRINGE (ML) INJECTION PRN
Status: DISCONTINUED | OUTPATIENT
Start: 2021-11-15 | End: 2021-11-15 | Stop reason: HOSPADM

## 2021-11-15 RX ORDER — PROPOFOL 10 MG/ML
INJECTION, EMULSION INTRAVENOUS PRN
Status: DISCONTINUED | OUTPATIENT
Start: 2021-11-15 | End: 2021-11-15 | Stop reason: SDUPTHER

## 2021-11-15 RX ORDER — SODIUM CHLORIDE, SODIUM LACTATE, POTASSIUM CHLORIDE, CALCIUM CHLORIDE 600; 310; 30; 20 MG/100ML; MG/100ML; MG/100ML; MG/100ML
INJECTION, SOLUTION INTRAVENOUS CONTINUOUS
Status: DISCONTINUED | OUTPATIENT
Start: 2021-11-16 | End: 2021-11-15

## 2021-11-15 RX ORDER — DILTIAZEM HYDROCHLORIDE 180 MG/1
180 CAPSULE, COATED, EXTENDED RELEASE ORAL DAILY
Status: DISCONTINUED | OUTPATIENT
Start: 2021-11-15 | End: 2021-11-16 | Stop reason: HOSPADM

## 2021-11-15 RX ORDER — ASPIRIN 81 MG/1
81 TABLET ORAL DAILY
Status: DISCONTINUED | OUTPATIENT
Start: 2021-11-15 | End: 2021-11-15

## 2021-11-15 RX ORDER — DEXAMETHASONE SODIUM PHOSPHATE 10 MG/ML
INJECTION INTRAMUSCULAR; INTRAVENOUS PRN
Status: DISCONTINUED | OUTPATIENT
Start: 2021-11-15 | End: 2021-11-15 | Stop reason: SDUPTHER

## 2021-11-15 RX ORDER — GLYCOPYRROLATE 1 MG/5 ML
SYRINGE (ML) INTRAVENOUS PRN
Status: DISCONTINUED | OUTPATIENT
Start: 2021-11-15 | End: 2021-11-15 | Stop reason: SDUPTHER

## 2021-11-15 RX ORDER — PANTOPRAZOLE SODIUM 40 MG/1
40 TABLET, DELAYED RELEASE ORAL
Status: DISCONTINUED | OUTPATIENT
Start: 2021-11-16 | End: 2021-11-16 | Stop reason: HOSPADM

## 2021-11-15 RX ORDER — ROCURONIUM BROMIDE 10 MG/ML
INJECTION, SOLUTION INTRAVENOUS PRN
Status: DISCONTINUED | OUTPATIENT
Start: 2021-11-15 | End: 2021-11-15 | Stop reason: SDUPTHER

## 2021-11-15 RX ORDER — GABAPENTIN 300 MG/1
300 CAPSULE ORAL 3 TIMES DAILY
Status: DISCONTINUED | OUTPATIENT
Start: 2021-11-15 | End: 2021-11-16 | Stop reason: HOSPADM

## 2021-11-15 RX ADMIN — DOCUSATE SODIUM 50MG AND SENNOSIDES 8.6MG 1 TABLET: 8.6; 5 TABLET, FILM COATED ORAL at 20:08

## 2021-11-15 RX ADMIN — SOTALOL HYDROCHLORIDE 120 MG: 80 TABLET ORAL at 20:09

## 2021-11-15 RX ADMIN — Medication 10 MG: at 07:50

## 2021-11-15 RX ADMIN — ACETAMINOPHEN 1000 MG: 500 TABLET ORAL at 07:03

## 2021-11-15 RX ADMIN — Medication 25 MG: at 07:41

## 2021-11-15 RX ADMIN — SODIUM CHLORIDE, POTASSIUM CHLORIDE, SODIUM LACTATE AND CALCIUM CHLORIDE: 600; 310; 30; 20 INJECTION, SOLUTION INTRAVENOUS at 16:05

## 2021-11-15 RX ADMIN — PROPOFOL 160 MG: 10 INJECTION, EMULSION INTRAVENOUS at 07:41

## 2021-11-15 RX ADMIN — HYDROMORPHONE HYDROCHLORIDE 0.5 MG: 2 INJECTION INTRAMUSCULAR; INTRAVENOUS; SUBCUTANEOUS at 10:22

## 2021-11-15 RX ADMIN — LEVOTHYROXINE SODIUM 50 MCG: 0.05 TABLET ORAL at 16:05

## 2021-11-15 RX ADMIN — VENLAFAXINE HYDROCHLORIDE 75 MG: 75 CAPSULE, EXTENDED RELEASE ORAL at 15:59

## 2021-11-15 RX ADMIN — GABAPENTIN 300 MG: 300 CAPSULE ORAL at 20:09

## 2021-11-15 RX ADMIN — Medication 20 MG: at 09:38

## 2021-11-15 RX ADMIN — Medication 50 MCG: at 08:30

## 2021-11-15 RX ADMIN — TRANEXAMIC ACID 1000 MG: 100 INJECTION, SOLUTION INTRAVENOUS at 07:45

## 2021-11-15 RX ADMIN — Medication 50 MCG: at 08:35

## 2021-11-15 RX ADMIN — TRANEXAMIC ACID 1000 MG: 100 INJECTION, SOLUTION INTRAVENOUS at 09:35

## 2021-11-15 RX ADMIN — OXYCODONE 5 MG: 5 TABLET ORAL at 20:09

## 2021-11-15 RX ADMIN — MULTIPLE VITAMINS W/ MINERALS TAB 1 TABLET: TAB at 15:59

## 2021-11-15 RX ADMIN — HYDROMORPHONE HYDROCHLORIDE 0.5 MG: 2 INJECTION INTRAMUSCULAR; INTRAVENOUS; SUBCUTANEOUS at 10:08

## 2021-11-15 RX ADMIN — Medication 100 MCG: at 07:41

## 2021-11-15 RX ADMIN — Medication 3 MG: at 09:50

## 2021-11-15 RX ADMIN — CEFAZOLIN 2000 MG: 10 INJECTION, POWDER, FOR SOLUTION INTRAVENOUS at 15:59

## 2021-11-15 RX ADMIN — Medication 10 MG: at 08:13

## 2021-11-15 RX ADMIN — LIDOCAINE HYDROCHLORIDE 100 MG: 20 INJECTION, SOLUTION EPIDURAL; INFILTRATION; INTRACAUDAL; PERINEURAL at 07:41

## 2021-11-15 RX ADMIN — SODIUM CHLORIDE, POTASSIUM CHLORIDE, SODIUM LACTATE AND CALCIUM CHLORIDE: 600; 310; 30; 20 INJECTION, SOLUTION INTRAVENOUS at 06:29

## 2021-11-15 RX ADMIN — ACETAMINOPHEN 650 MG: 325 TABLET ORAL at 15:06

## 2021-11-15 RX ADMIN — GABAPENTIN 300 MG: 300 CAPSULE ORAL at 15:59

## 2021-11-15 RX ADMIN — ACETAMINOPHEN 650 MG: 325 TABLET ORAL at 20:08

## 2021-11-15 RX ADMIN — ROCURONIUM BROMIDE 40 MG: 10 INJECTION, SOLUTION INTRAVENOUS at 07:41

## 2021-11-15 RX ADMIN — Medication 0.6 MG: at 09:48

## 2021-11-15 RX ADMIN — OXYCODONE 10 MG: 5 TABLET ORAL at 15:06

## 2021-11-15 RX ADMIN — HYDROMORPHONE HYDROCHLORIDE 1 MG: 2 INJECTION INTRAMUSCULAR; INTRAVENOUS; SUBCUTANEOUS at 09:09

## 2021-11-15 RX ADMIN — ROCURONIUM BROMIDE 10 MG: 10 INJECTION, SOLUTION INTRAVENOUS at 08:25

## 2021-11-15 RX ADMIN — FENTANYL CITRATE 25 MCG: 50 INJECTION, SOLUTION INTRAMUSCULAR; INTRAVENOUS at 11:56

## 2021-11-15 RX ADMIN — CEFAZOLIN 2000 MG: 10 INJECTION, POWDER, FOR SOLUTION INTRAVENOUS at 07:36

## 2021-11-15 RX ADMIN — FENTANYL CITRATE 50 MCG: 50 INJECTION, SOLUTION INTRAMUSCULAR; INTRAVENOUS at 10:58

## 2021-11-15 RX ADMIN — Medication 25 MG: at 08:30

## 2021-11-15 RX ADMIN — DEXAMETHASONE SODIUM PHOSPHATE 10 MG: 10 INJECTION INTRAMUSCULAR; INTRAVENOUS at 07:45

## 2021-11-15 RX ADMIN — CEFAZOLIN 2000 MG: 10 INJECTION, POWDER, FOR SOLUTION INTRAVENOUS at 22:58

## 2021-11-15 RX ADMIN — DILTIAZEM HYDROCHLORIDE 180 MG: 180 CAPSULE, COATED, EXTENDED RELEASE ORAL at 15:59

## 2021-11-15 RX ADMIN — ONDANSETRON 4 MG: 2 INJECTION, SOLUTION INTRAMUSCULAR; INTRAVENOUS at 09:39

## 2021-11-15 RX ADMIN — Medication 10 MG: at 07:46

## 2021-11-15 RX ADMIN — MIDAZOLAM 2 MG: 1 INJECTION INTRAMUSCULAR; INTRAVENOUS at 07:36

## 2021-11-15 RX ADMIN — SODIUM CHLORIDE, POTASSIUM CHLORIDE, SODIUM LACTATE AND CALCIUM CHLORIDE: 600; 310; 30; 20 INJECTION, SOLUTION INTRAVENOUS at 08:50

## 2021-11-15 ASSESSMENT — PULMONARY FUNCTION TESTS
PIF_VALUE: 22
PIF_VALUE: 19
PIF_VALUE: 21
PIF_VALUE: 8
PIF_VALUE: 16
PIF_VALUE: 22
PIF_VALUE: 28
PIF_VALUE: 22
PIF_VALUE: 17
PIF_VALUE: 21
PIF_VALUE: 22
PIF_VALUE: 17
PIF_VALUE: 22
PIF_VALUE: 26
PIF_VALUE: 22
PIF_VALUE: 19
PIF_VALUE: 25
PIF_VALUE: 15
PIF_VALUE: 21
PIF_VALUE: 21
PIF_VALUE: 15
PIF_VALUE: 21
PIF_VALUE: 22
PIF_VALUE: 22
PIF_VALUE: 21
PIF_VALUE: 17
PIF_VALUE: 23
PIF_VALUE: 22
PIF_VALUE: 19
PIF_VALUE: 22
PIF_VALUE: 21
PIF_VALUE: 22
PIF_VALUE: 23
PIF_VALUE: 17
PIF_VALUE: 21
PIF_VALUE: 22
PIF_VALUE: 4
PIF_VALUE: 23
PIF_VALUE: 2
PIF_VALUE: 21
PIF_VALUE: 23
PIF_VALUE: 19
PIF_VALUE: 21
PIF_VALUE: 16
PIF_VALUE: 1
PIF_VALUE: 21
PIF_VALUE: 19
PIF_VALUE: 21
PIF_VALUE: 21
PIF_VALUE: 22
PIF_VALUE: 36
PIF_VALUE: 23
PIF_VALUE: 17
PIF_VALUE: 3
PIF_VALUE: 2
PIF_VALUE: 21
PIF_VALUE: 21
PIF_VALUE: 23
PIF_VALUE: 22
PIF_VALUE: 2
PIF_VALUE: 1
PIF_VALUE: 14
PIF_VALUE: 24
PIF_VALUE: 22
PIF_VALUE: 21
PIF_VALUE: 23
PIF_VALUE: 21
PIF_VALUE: 23
PIF_VALUE: 23
PIF_VALUE: 35
PIF_VALUE: 22
PIF_VALUE: 17
PIF_VALUE: 23
PIF_VALUE: 17
PIF_VALUE: 17
PIF_VALUE: 22
PIF_VALUE: 22
PIF_VALUE: 19
PIF_VALUE: 17
PIF_VALUE: 30
PIF_VALUE: 22
PIF_VALUE: 22
PIF_VALUE: 23
PIF_VALUE: 22
PIF_VALUE: 0
PIF_VALUE: 22
PIF_VALUE: 21
PIF_VALUE: 21
PIF_VALUE: 25
PIF_VALUE: 17
PIF_VALUE: 22
PIF_VALUE: 2
PIF_VALUE: 23
PIF_VALUE: 23
PIF_VALUE: 21
PIF_VALUE: 21
PIF_VALUE: 25
PIF_VALUE: 17
PIF_VALUE: 17
PIF_VALUE: 22
PIF_VALUE: 21
PIF_VALUE: 19
PIF_VALUE: 23
PIF_VALUE: 17
PIF_VALUE: 22
PIF_VALUE: 22
PIF_VALUE: 18
PIF_VALUE: 22
PIF_VALUE: 21
PIF_VALUE: 21
PIF_VALUE: 22
PIF_VALUE: 22
PIF_VALUE: 21
PIF_VALUE: 21
PIF_VALUE: 22
PIF_VALUE: 23
PIF_VALUE: 22
PIF_VALUE: 15
PIF_VALUE: 21
PIF_VALUE: 22
PIF_VALUE: 17
PIF_VALUE: 21
PIF_VALUE: 24
PIF_VALUE: 17
PIF_VALUE: 17
PIF_VALUE: 21
PIF_VALUE: 21
PIF_VALUE: 0
PIF_VALUE: 2
PIF_VALUE: 23
PIF_VALUE: 22
PIF_VALUE: 18
PIF_VALUE: 17
PIF_VALUE: 28
PIF_VALUE: 22
PIF_VALUE: 22
PIF_VALUE: 16
PIF_VALUE: 19
PIF_VALUE: 19
PIF_VALUE: 22
PIF_VALUE: 23
PIF_VALUE: 23
PIF_VALUE: 21
PIF_VALUE: 17
PIF_VALUE: 22
PIF_VALUE: 23
PIF_VALUE: 24
PIF_VALUE: 23
PIF_VALUE: 14
PIF_VALUE: 22
PIF_VALUE: 22
PIF_VALUE: 35
PIF_VALUE: 23

## 2021-11-15 ASSESSMENT — PAIN DESCRIPTION - LOCATION
LOCATION: KNEE

## 2021-11-15 ASSESSMENT — PAIN SCALES - GENERAL
PAINLEVEL_OUTOF10: 6
PAINLEVEL_OUTOF10: 0
PAINLEVEL_OUTOF10: 5
PAINLEVEL_OUTOF10: 6
PAINLEVEL_OUTOF10: 4
PAINLEVEL_OUTOF10: 5
PAINLEVEL_OUTOF10: 0
PAINLEVEL_OUTOF10: 0
PAINLEVEL_OUTOF10: 10
PAINLEVEL_OUTOF10: 4

## 2021-11-15 ASSESSMENT — PAIN DESCRIPTION - DESCRIPTORS
DESCRIPTORS: DULL;HEAVINESS
DESCRIPTORS: DULL;HEAVINESS

## 2021-11-15 ASSESSMENT — PAIN DESCRIPTION - PAIN TYPE: TYPE: SURGICAL PAIN

## 2021-11-15 ASSESSMENT — LIFESTYLE VARIABLES: SMOKING_STATUS: 1

## 2021-11-15 ASSESSMENT — PAIN DESCRIPTION - ORIENTATION
ORIENTATION: RIGHT
ORIENTATION: RIGHT

## 2021-11-15 NOTE — ANESTHESIA PRE PROCEDURE
Department of Anesthesiology  Preprocedure Note       Name:  Anel Biswas   Age:  78 y.o.  :  1955                                          MRN:  [id number]         Date:  11/15/2021      Surgeon: Lito Apple):  Stacy Benz MD    Procedure: Procedure(s):  RIGHT KNEE TOTAL ARTHROPLASTY - DEPUY  CEMENTED    Medications prior to admission:   Prior to Admission medications    Medication Sig Start Date End Date Taking?  Authorizing Provider   apixaban (ELIQUIS) 5 MG TABS tablet Take by mouth 2 times daily   Yes Historical Provider, MD   venlafaxine (EFFEXOR XR) 75 MG extended release capsule Take 150 mg by mouth daily  8/3/21  Yes Historical Provider, MD   buPROPion (WELLBUTRIN XL) 150 MG extended release tablet Take 150 mg by mouth every morning  10/6/21  Yes Historical Provider, MD rubioTIAZebennie ROBERTSON [facility]) 180 MG extended release capsule Take 180 mg by mouth daily 10/20/21  Yes Historical Provider, MD   furosemide (LASIX) 20 MG tablet Take 20 mg by mouth daily as needed 10/5/21  Yes Historical Provider, MD   levothyroxine (SYNTHROID) 50 MCG tablet Take 50 mcg by mouth Daily  21  Yes Historical Provider, MD   mupirocin (BACTROBAN) 2 % ointment APPLY TO EACH NOSTRIL WITH A EVERY-TIP TWICE DAILY STARTING 5 DAYS PRIOR TO SUGERY 10/6/21  Yes Historical Provider, MD   sotalol (BETAPACE) 120 MG tablet Take 120 mg by mouth 2 times daily 10/20/21  Yes Historical Provider, MD   albuterol sulfate HFA (PROAIR HFA) 108 (90 Base) MCG/ACT inhaler Inhale 2 puffs into the lungs every 4 hours as needed 10/5/21 10/5/22 Yes Historical Provider, MD   omeprazole (PRILOSEC) 20 MG delayed release capsule Take 20 mg by mouth daily   Yes Historical Provider, MD   Multiple Vitamins-Minerals (ONE-A-DAY WOMENS PO) Take 1 tablet by mouth daily   Yes Historical Provider, MD   aspirin 81 MG EC tablet Take 81 mg by mouth daily    Historical Provider, MD       Current medications:    Current Facility-Administered Medications   Medication Dose Route Frequency Provider Last Rate Last Chris Thorpe ON 11/16/2021] lactated ringers infusion   IntraVENous Continuous Tatyana Fernandes,  mL/hr at 11/15/21 0629 New Bag at 11/15/21 0629    sodium chloride flush 0.9 % injection 10 mL  10 mL IntraVENous 2 times per day Andrea Major DO        sodium chloride flush 0.9 % injection 10 mL  10 mL IntraVENous PRN Tatyana Fernandes DO        0.9 % sodium chloride infusion  25 mL IntraVENous PRN Tatyana Fernandes DO        [START ON 11/16/2021] lidocaine PF 1 % injection 1 mL  1 mL IntraDERmal Once PRN Andrea Major DO        ceFAZolin (ANCEF) 2000 mg in dextrose 5 % 50 mL IVPB  2,000 mg IntraVENous Once Lavon Hurley MD        celecoxib (CELEBREX) capsule 200 mg  200 mg Oral Once Sisi Calix MD        gabapentin (NEURONTIN) capsule 300 mg  300 mg Oral Once Sisi Calix MD        midazolam (VERSED) injection 2 mg  2 mg IntraVENous Once Nancy Beatty MD        tranexamic acid (CYKLOKAPRON) 1000 MG/10ML injection             bupivacaine liposome (EXPAREL) 1.3 % injection 133 mg  10 mL SubCUTAneous Once Nancy Beatty MD           Allergies:  No Known Allergies    Problem List:  There is no problem list on file for this patient.       Past Medical History:        Diagnosis Date    [facility]fib ([facility] [address]     Asthma     GERD (gastroesophageal reflux disease)     Thyroid disease        Past Surgical History:        Procedure Laterality Date    BLADDER SUSPENSION      BREAST BIOPSY      BREAST SURGERY Bilateral     augment    CARDIAC SURGERY      valve repair    CARDIOVERSION      COLONOSCOPY      HYSTERECTOMY      SHOULDER SURGERY Right     TONSILLECTOMY         Social History:    Social History     Tobacco Use    Smoking status: Current Every Day Smoker     Packs/day: 0.50     Types: Cigarettes    Smokeless tobacco: Never Used   Substance Use Topics    Alcohol use: Yes     Comment: daily                                Ready to quit: Not Answered  Counseling given: Not Answered      Vital Signs (Current):   Vitals:    11/15/21 0552   BP: (!) 167/85   Pulse: 59   Resp: 18   Temp: 97.1 °F (36.2 °C)   SpO2: 96%   Weight: 196 lb (88.9 kg)   Height: 5' 2\" (1.575 m)                                              BP Readings from Last 3 Encounters:   11/15/21 (!) 167/85   10/21/21 (!) 153/83       NPO Status: Time of last liquid consumption: 2230                        Time of last solid consumption: 2230                        Date of last liquid consumption: 11/14/21                        Date of last solid food consumption: 11/14/21    BMI:   Wt Readings from Last 3 Encounters:   11/15/21 196 lb (88.9 kg)   10/21/21 196 lb (88.9 kg)     Body mass index is 35.85 kg/m². CBC:   Lab Results   Component Value Date    WBC 5.9 10/21/2021    RBC 4.58 10/21/2021    HGB 15.2 10/21/2021    HCT 46.8 10/21/2021    .2 10/21/2021    RDW 12.2 10/21/2021     10/21/2021       CMP:   Lab Results   Component Value Date     10/21/2021    K 4.7 10/21/2021     10/21/2021    CO2 29 10/21/2021    BUN 7 10/21/2021    CREATININE 0.52 10/21/2021    GFRAA >60 10/21/2021    LABGLOM >60 10/21/2021    GLUCOSE 100 10/21/2021    CALCIUM 9.2 10/21/2021       POC Tests: No results for input(s): POCGLU, POCNA, POCK, POCCL, POCBUN, POCHEMO, POCHCT in the last 72 hours.     Coags: No results found for: PROTIME, INR, APTT    HCG (If Applicable): No results found for: PREGTESTUR, PREGSERUM, HCG, HCGQUANT     ABGs: No results found for: PHART, PO2ART, HKN5CPG, NTW9RNF, BEART, [id number]     Type & Screen (If Applicable):  No results found for: LABABO, LABRH    Drug/Infectious Status (If Applicable):  No results found for: HIV, HEPCAB    COVID-19 Screening (If Applicable): No results found for: COVID19        Anesthesia Evaluation    Airway: Mallampati: I  TM distance: >3 FB   Neck ROM: full  Mouth opening: > = 3 FB Dental:          Pulmonary:   (+) asthma: current

## 2021-11-15 NOTE — PROGRESS NOTES
Physical Therapy    Facility/Department: [facility]  Initial Assessment - Day of Surgery     NAME: Mayra Fox  : 1955  MRN: [id number]    Date of Service: 11/15/2021   ANIBAL Kwon reports patient is medically stable for therapy treatment this date. Chart reviewed prior to treatment and patient is agreeable for therapy. All lines intact and patient positioned comfortably at end of treatment. All patient needs addressed prior to ending therapy session. H&P: R TKA 11/15/21, Dr. Aileen Mcdonnell     Discharge Recommendations:  Patient would benefit from continued therapy after discharge     PT Equipment Recommendations  Equipment Needed: Yes  Mobility Devices: Anel Salines: Rolling    Assessment   Body structures, Functions, Activity limitations: Decreased functional mobility ; Decreased ROM; Decreased strength; Decreased safe awareness; Decreased balance; Increased pain  Assessment: Pt tolerated PT eval fair. Pt requiring skilled 2 person assist for functional mobility for safety this date. Pt's activity limited by RLE numbness s/p R TKA. Pt would benefit from continued skilled physical therapy to address R TKA recovery in order to return to PLOF. Prognosis: Good  Decision Making: Medium Complexity  PT Education: Goals; PT Role; Plan of Care; General Safety; Energy Conservation; Home Exercise Program; Precautions; Gait Training; Weight-bearing Education; Functional Mobility Training; Transfer Training  Patient Education: Pt educated on: purpose of acute PT eval, importance of continued mobility throughout admission, TKA HEP, general safety awareness, safe transfers w/ RW, safe ambulation w/ RW, proper gait pattern s/p R TKA, WBAT status, and PT POC. Pt verbalized fair understanding. REQUIRES PT FOLLOW UP: Yes  Activity Tolerance  Activity Tolerance: Other  Activity Tolerance: Limited by RLE numbness       Patient Diagnosis(es): There were no encounter diagnoses.      has a past medical history of A-fib ([facility] [address] Asthma, GERD (gastroesophageal reflux disease), and Thyroid disease. has a past surgical history that includes Hysterectomy; Breast surgery (Bilateral); shoulder surgery (Right); Cardiac surgery; bladder suspension; Breast biopsy; Tonsillectomy; Colonoscopy; Cardioversion; and Total knee arthroplasty (Right, 11/15/2021). Restrictions  Restrictions/Precautions  Restrictions/Precautions: General Precautions, Weight Bearing, Fall Risk  Required Braces or Orthoses?: No  Lower Extremity Weight Bearing Restrictions  Right Lower Extremity Weight Bearing: Weight Bearing As Tolerated  Position Activity Restriction  Other position/activity restrictions: LUE IV, polar care, B Knee High NANI hose  Vision/Hearing  Vision: Impaired  Vision Exceptions: Wears glasses for reading  Hearing: Within functional limits     Subjective  General  Patient assessed for rehabilitation services?: Yes  Response To Previous Treatment: Not applicable  Family / Caregiver Present: No  Follows Commands: Within Functional Limits  General Comment  Comments: RN and pt agreeable to therapy. Pt supine in bed upon arrival.  Pt pleasant and cooperative throughout. Subjective  Subjective: Pt reports 5-6/10 VAS pain in RLE at start of PT eval this date. RN aware and giving pain meds.   Also reporting \"my leg feels heavy\"  Pain Screening  Patient Currently in Pain: Denies        Orientation  Orientation  Overall Orientation Status: Within Functional Limits  Social/Functional History  Social/Functional History  Lives With: Alone  Type of Home: House  Home Layout: One level  Home Access: Stairs to enter with rails  Entrance Stairs - Number of Steps: 4  Entrance Stairs - Rails: Right  Bathroom Shower/Tub: Tub/Shower unit  Bathroom Toilet: Handicap height  Bathroom Equipment: Shower chair  Home Equipment: Cane (no DME at baseline)  Receives Help From: Family  ADL Assistance: [facility] [address]: [address] [facility] Responsibilities: Yes  Ambulation Assistance: Independent  Transfer Assistance: Independent  Active : Yes  Occupation: Retired, Part time employment  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 stationary bike  Additional Comments: pt reports no recent falls, reports daughter will be staying w/ her for the first 4 days  Cognition   Cognition  Overall Cognitive Status: [facility]  Safety Judgement: Decreased awareness of need for assistance; Decreased awareness of need for safety  Sequencing: Requires cues for some    Objective     Observation/Palpation  Posture: Good  Observation: RLE surgical dressing, ACE wrap, and polar care in place. LLE NANI hose on upon arrival  Edema: R foot    PROM RLE (degrees)  RLE General PROM: Knee 0-90 degrees  AROM RLE (degrees)  RLE General AROM: Knee 0-60 degrees  AROM LLE (degrees)  LLE AROM : WFL  AROM RUE (degrees)  RUE AROM : WFL  AROM LUE (degrees)  LUE AROM : WFL  Strength RLE  Comment: Quad set+, Glute set+  Strength LLE  Strength LLE: WFL  Comment: Grossly 4+/5  Strength RUE  Strength RUE: WFL  Comment: See OT assessment for detail  Strength LUE  Strength LUE: WFL  Comment: See OT assessment for detail  Tone RLE  RLE Tone: Normotonic  Tone LLE  LLE Tone: Normotonic  Motor Control  Gross Motor?: WFL  Sensation  Overall Sensation Status: Impaired (numbness in RLE s/p R TKA)  Bed mobility  Supine to Sit: 2 Person assistance; Minimal assistance  Sit to Supine: Moderate assistance; 2 Person assistance (assist w/ progression of BLE & trunk)  Scooting: Minimal assistance  Comment: Pt w/ difficulty throughout bed mobility, requiring mod verbal cuieng for sequencing and progression throughout w/ fair return demo. Mod verbal cueing for use of bedrails for UE assist throughout w/ fair return demo. Upon sitting EOB, pt denying any dizziness/lightheadedness.   Transfers  Sit to Stand: Moderate Assistance; 2 Person Assistance  Stand to sit: Minimal Assistance; 2 Person Assistance (to control descent)  Comment: Pt w/ fair steadiness throughout transfers this date, requiring MAX verbal and tactile cueing for proper hand placement throughout transfers w/ fair return demo. Pt also requiring max verbal cueing for paced breathing for pain control throughout w/ poor return demo. Pt also educated on kicking RLE out throughout for pain control w/ good return demo. Upon standing, pt reporting mild dizziness which subsided in <30 seconds. While standing EOB, pt performed pre-gait lateral weight shifting and steps in place to assess R quad control w/ fair steadiness noted. Pt reporting being unable to feel RLE throughout. Ambulation  Ambulation?: Yes  More Ambulation?: No  Ambulation 1  Surface: level tile  Device: Rolling Walker  Assistance: Moderate assistance; 2 Person assistance; Minimal assistance  Quality of Gait: step-to pattern  Gait Deviations: Slow Pat; Decreased step length; Decreased step height; Shuffles  Distance: 6 steps  Comments: Pt initially w/ poor steadiness throughout ambulation requiring ModA x 2 for safety and steadiness as pt reporting hesitancy and demonstrating difficulty progressing RLE. Pt progressing to MinAx2 requiring increased time and effort w/ each step and requiring MAX verbal and tactile cueing for proper gait pattern w/ RW throughout w/ fair return demo. Pt demonstrating shuffling of RLE w/ significantly decreased step height throughout. Stairs/Curb  Stairs?: No (Pt w/ RLE numbness, requiring 2 person assist for safety w/ transfers and ambulation. Pt able to tolerate minimal ambulation and demonstrating inability to lift RLE necessary step height throughout ambulation.   Unsafe to attempt stairs this date.)     Balance  Posture: Good  Sitting - Static: Good  Sitting - Dynamic: Good  Standing - Static: Fair; -  Standing - Dynamic: Poor; +  Comments: Standing balance assessed w/ RW  Exercises  Quad Sets: x 10  Heelslides: x 5 RLE AAROM  Gluteal Sets: x 10  Ankle Pumps: x 10 BLE  Comments: Pt provided w/ written and verbal instruction for TKA HEP this date. Plan   Plan  Times per week: BID; 7x/week  Current Treatment Recommendations: Strengthening, ROM, Balance Training, Functional Mobility Training, Stair training, Gait Training, Transfer Training, Endurance Training, Safety Education & Training, Home Exercise Program, Equipment Evaluation, Education, & procurement, Patient/Caregiver Education & Training, Neuromuscular Re-education  Safety Devices  Type of devices: Bed alarm in place, Call light within reach, Gait belt, Nurse notified, Left in bed  Restraints  Initially in place: No    AM-PAC Score  AM-PAC Inpatient Mobility Raw Score : 15 (11/15/21 1604)  AM-PAC Inpatient T-Scale Score : 39.45 (11/15/21 1604)  Mobility Inpatient CMS 0-100% Score: 57.7 (11/15/21 1604)  Mobility Inpatient CMS G-Code Modifier : CK (11/15/21 1604)       Functional Outcome Measure-   Single Leg Stance Test:  0 sec. (<5 sec.= fall risk)    Goals  Short term goals  Time Frame for Short term goals: 10 visits  Short term goal 1: Pt to demonstrate bed mobility Lyndsey  Short term goal 2: Pt to perform STS transfers w/ RW CGA  Short term goal 3: Pt to ambulate at least 150ft w/ RW CGA  Short term goal 4: Pt to ascend/descend 4 stairs w/ non-reciprocal stair pattern Seun  Short term goal 5: Pt to be independent w/ TKA HEP  Patient Goals   Patient goals : To go home, to recover       Therapy Time   Individual Concurrent Group Co-treatment   Time In 1500         Time Out 1553         Minutes 53           Treatment time: 40 minutes       Co-treatment with OT warranted first time up day of surgery. Cotx due to potential risk of decreased sensation, muscle control and proprioception from spinal epidural and/or regional block.  Decreased safety and independence requiring 2 skilled therapy professionals to address individual discipline's goals.      Patricia Conway, PT

## 2021-11-15 NOTE — PROGRESS NOTES
Occupational Therapy   Occupational Therapy Initial Assessment  Date: 11/15/2021   Patient Name: Elvira Valdovinos  MRN: [id number]     : 1955    Date of Service: 11/15/2021    Discharge Recommendations:  Patient would benefit from continued therapy after discharge  OT Equipment Recommendations  Equipment Needed: Yes  Mobility Devices: ADL Assistive Devices  ADL Assistive Devices: Sock-Aid Hard; Reacher; Long-handled Sponge; Dressing Stick     RN reports patient is medically stable for therapy treatment this date. Chart reviewed prior to treatment and patient is agreeable for therapy. All lines intact and patient positioned comfortably at end of treatment. All patient needs addressed prior to ending therapy session. Per H&P: his is Ro Sena a 78 y.o. female who presents for a pre-admission testing appointment for an upcoming right total knee arthroplasty by Dr. Malissa Romero scheduled on 11/15/2021 at 0730 due to right knee OA. The patient's chief complaint is 4-9/10 right knee pain which has progressively worsened over the past 2 years. The pain is aggravated by activity; and is minimally relieved with Tylenol Arthritis. The right knee gives out, clicks, and swells. Prior treatment includes right knee injections and physical therapy which improved the right knee pain. Denies recent falls and injuries. Assessment   Performance deficits / Impairments: Decreased functional mobility ; Decreased safe awareness; Decreased balance; Decreased ADL status; Decreased cognition; Decreased endurance; Decreased strength; Decreased sensation  Assessment: Skilled OT is indicated to increase overall IND and safety with self-care and functional tasks to return home when appropriate  Prognosis: Good  Decision Making: Medium Complexity  OT Education: OT Role; Plan of Care; Precautions; ADL Adaptive Strategies; Transfer Training; Energy Conservation;  Family Education; Equipment  Patient Education: OT POC, benefits of OOB activity, call light/fall prevention, and safety in function  REQUIRES OT FOLLOW UP: Yes  Activity Tolerance  Activity Tolerance: Patient Tolerated treatment well  Safety Devices  Safety Devices in place: Yes  Type of devices: All fall risk precautions in place; Nurse notified; Gait belt; Patient at risk for falls; Left in bed; Call light within reach           Patient Diagnosis(es): There were no encounter diagnoses. has a past medical history of A-fib ([address] Asthma, GERD (gastroesophageal reflux disease), and Thyroid disease. has a past surgical history that includes Hysterectomy; Breast surgery (Bilateral); shoulder surgery (Right); Cardiac surgery; bladder suspension; Breast biopsy; Tonsillectomy; Colonoscopy; Cardioversion; and Total knee arthroplasty (Right, 11/15/2021).            Restrictions  Restrictions/Precautions  Restrictions/Precautions: General Precautions, Weight Bearing, Fall Risk  Required Braces or Orthoses?: No  Lower Extremity Weight Bearing Restrictions  Right Lower Extremity Weight Bearing: Weight Bearing As Tolerated  Position Activity Restriction  Other position/activity restrictions: LUE IV, polar care, B Knee High NANI hose    Subjective   General  Chart Reviewed: Yes  Patient assessed for rehabilitation services?: Yes  Family / Caregiver Present: No  Subjective  Subjective: pt resting in bed, agreeable to OT eval  Pain Assessment  Pain Assessment: 0-10  Pain Level: 4  Pain Type: Surgical pain  Pain Location: Knee  Pain Orientation: Right  Response to Pain Intervention: Patient Satisfied  Vital Signs  Temp: 97.3 °F (36.3 °C)  Temp Source: Oral  Pulse: 75  Heart Rate Source: Monitor  Resp: 18  BP: (!) 142/87  BP Location: Right Arm  MAP (mmHg): 101  Level of Consciousness: Alert (0)  Oxygen Therapy  SpO2: 93 %  O2 Device: None (Room air)  Social/Functional History  Social/Functional History  Lives With: Alone  Type of Home: House  Home Layout: One level  Home Access: Stairs to enter with rails  Entrance Stairs - Number of Steps: 4  Entrance Stairs - Rails: Right  Bathroom Shower/Tub: Tub/Shower unit  Bathroom Toilet: Handicap height  Bathroom Equipment: Shower chair  Home Equipment: Cane (no DME at baseline)  Receives Help From: Family  ADL Assistance: Independent  Homemaking Assistance: Independent  Homemaking Responsibilities: Yes  Ambulation Assistance: Independent  Transfer Assistance: Independent  Active : Yes  Occupation: Retired, Part time employment  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 stationary bike  Additional Comments: pt reports no recent falls, reports daughter will be staying w/ her for the first 4 days       Objective   Vision: Impaired  Vision Exceptions: Wears glasses for reading  Hearing: Within functional limits    Orientation  Overall Orientation Status: Within Functional Limits  Observation/Palpation  Posture: Good  Observation: RLE surgical dressing, ACE wrap, and polar care in place. LLE NANI hose on upon arrival  Edema: R foot  Balance  Sitting Balance: Supervision  Standing Balance: Moderate assistance (x2)  Standing Balance  Time: ~3-4 min  Activity: functional mob EOB  Comment: Pt reports numbness in RLE and unable to feel the floor, and RLE was shakey. Pt weight shifted and took small marching steps to determine if her RLE would buckle. Functional Mobility  Functional - Mobility Device: Rolling Walker  Activity: Other  Assist Level: Moderate assistance (x2)  Functional Mobility Comments: Mod A x2- Min A x2. Mod A x2 for first step away from bed for safety. Pt required MAX VC/tactile cues for RW safety/mgmt, assist/awareness of lines, upright posture, and sequencing steps. Pt took 3 small steps forward and back. Pt not appopriate to walk to chair this date.   ADL  Feeding: Modified independent   Grooming: Stand by assistance (seated)  UE Bathing: Stand by assistance  LE Bathing: Maximum assistance  UE Dressing: Minimal assistance (Min A with gown for modesty)  LE Dressing: Maximum assistance (DEP don R sock)  Toileting: Moderate assistance  Additional Comments: Pt limited d/t dec standing jaison/balance, numbness in RLE, and dec safety awareness. OT provided education on RW mgmt and safety, pt with Fair understanding and requires continued education. Tone RUE  RUE Tone: Normotonic  Tone LUE  LUE Tone: Normotonic  Coordination  Movements Are Fluid And Coordinated: Yes     Bed mobility  Supine to Sit: 2 Person assistance; Minimal assistance  Sit to Supine: Moderate assistance; 2 Person assistance  Scooting: Minimal assistance  Comment: Mod VCs/tactile cues for assist/awarenss of lines, sequencing bed mob, and use of bed rails. Pt required physical assistance with RLE and trunk. Transfers  Sit to stand: Minimal assistance; 2 Person assistance  Stand to sit: Minimal assistance; 2 Person assistance  Transfer Comments: Pt impulsive. Min A x2 for safety. Pt required Mod VCs for proper hand placement on stable surface, controlled sit<>stand, exhaling during transfer, pacing, and kicking RLE out. Pt educated on squaring self/AD to surface. Pt with Fair understanding.      Cognition  Overall Cognitive Status: WFL  Safety Judgement: Decreased awareness of need for assistance; Decreased awareness of need for safety  Sequencing: Requires cues for some        Sensation  Overall Sensation Status: Impaired (numbness in RLE s/p R TKA)        LUE AROM (degrees)  LUE AROM : WFL  Left Hand AROM (degrees)  Left Hand AROM: WFL  RUE AROM (degrees)  RUE AROM : WFL  Right Hand AROM (degrees)  Right Hand AROM: WFL  LUE Strength  Gross LUE Strength: WFL  RUE Strength  Gross RUE Strength: WFL                   Plan   Plan  Times per week: 5-6x per week, 1-2x per day  Specific instructions for Next Treatment: Shower and demo AE for dressing tasks  Current Treatment Recommendations: Strengthening, Endurance Training, Patient/Caregiver Education & Training, Equipment Evaluation, Education, & procurement, Self-Care / ADL, Balance Training, Pain Management, Safety Education & Training, Functional Mobility Training, Home Management Training      AM-PAC Score        AM-PAC Inpatient Daily Activity Raw Score: 16 (11/15/21 1614)  AM-PAC Inpatient ADL T-Scale Score : 35.96 (11/15/21 1614)  ADL Inpatient CMS 0-100% Score: 53.32 (11/15/21 1614)  ADL Inpatient CMS G-Code Modifier : CK (11/15/21 1614)    Goals  Short term goals  Time Frame for Short term goals: by discharge, pt to demo  Short term goal 1: I/MI with bed mob tasks without bed rails  Short term goal 2: SUP for ADL transfers and functional mob with AD as needed and Good safety  Short term goal 3: I/MI for UB ADLs and Min A for LB ADLs with AE as needed and Good safety  Short term goal 4: pt to stand ~10+ min, with SUP, no LOB, AD as needed, and Good safety, to increase IND with self-care and functional tasks  Short term goal 5: Pt/family to be IND with EC/WS, fall prevention tech, as well as DME/AE recommendations with use of handouts as needed  Patient Goals   Patient goals : To go home       Therapy Time   Individual Concurrent Group Co-treatment   Time In 1500         Time Out 1543 (+10 chart review)         Minutes 53          tx time: 40 min       Upon writer exit, call light within reach, pt retired to bed. All lines intact and patient positioned comfortably. All patient needs addressed prior to ending therapy session. Chart reviewed prior to treatment and patient is agreeable for therapy. RN reports patient is medically stable for therapy treatment this date.     Kimberly Rodríguez OTR/L

## 2021-11-15 NOTE — INTERVAL H&P NOTE
Interval H&P Note    Pt Name: Rosa Elena No  MRN: [id number]  YOB: 1955  Date of evaluation: 11/15/2021      [x] I have reviewed the H&P by BIANCA Fuentes CNP for an Interval History and Physical note. [x] I have examined  Rosa Elena No  There are no changes to the patient who is scheduled for a right total knee arthroplasty - Allen Jordan for right knee OA. The patient denies new health changes, fever, chills, wheezing, cough, open sores or wounds. No DM  Last naprosyn 11/8/21     Hx A Fib and follows with Cardiologist, Dr Pepper Melo at [address] [place] [address] who cleared the patient for surgery on 10/26 Hardcopy progress note and clearance on chart  Patient admits she continues to have episodes of symptomatic irregular rapid heart but not within the past 24 hours. She denies palpitations, dizziness, lightheadedness, syncope, increased SOB or chest pain last Eliquis 11/12/21    Vital signs: BP (!) 167/85   Pulse 59   Temp 97.1 °F (36.2 °C)   Resp 18   SpO2 96%     Allergies:  Patient has no known allergies. Medications:    Prior to Admission medications    Medication Sig Start Date End Date Taking?  Authorizing Provider   venlafaxine (EFFEXOR XR) 75 MG extended release capsule Take 150 mg by mouth daily  8/3/21   Historical Provider, MD   buPROPion (WELLBUTRIN XL) 150 MG extended release tablet Take 150 mg by mouth every morning  10/6/21   Historical Provider, MD   [facility]) 180 MG extended release capsule Take 180 mg by mouth daily 10/20/21   Historical Provider, MD   furosemide (LASIX) 20 MG tablet Take 20 mg by mouth daily as needed 10/5/21   Historical Provider, MD   levothyroxine (SYNTHROID) 50 MCG tablet Take 50 mcg by mouth Daily  4/29/21   Historical Provider, MD   mupirocin (BACTROBAN) 2 % ointment APPLY TO EACH NOSTRIL WITH A EVERY-TIP TWICE DAILY STARTING 5 DAYS PRIOR TO SUGERY 10/6/21   Historical Provider, MD   sotalol (BETAPACE) 120 MG tablet Take 120 mg by mouth 2 times daily 10/20/21   Historical Provider, MD   albuterol sulfate HFA (PROAIR HFA) 108 (90 Base) MCG/ACT inhaler Inhale 2 puffs into the lungs every 4 hours as needed 10/5/21 10/5/22  Historical Provider, MD   omeprazole (PRILOSEC) 20 MG delayed release capsule Take 20 mg by mouth daily    Historical Provider, MD   aspirin 81 MG EC tablet Take 81 mg by mouth daily    Historical Provider, MD   Multiple Vitamins-Minerals (ONE-A-DAY WOMENS PO) Take 1 tablet by mouth daily    Historical Provider, MD   naproxen (NAPROSYN) 500 MG tablet Take 500 mg by mouth as needed for Pain    Historical Provider, MD         This is a 78 y.o.obese female who is pleasant, cooperative, alert and oriented x3, in no acute distress    Physical Exam:  Lungs: Bilateral equal air entry, unlabored, clear to ausculation, no wheezing, rales or rhonchi, normal effort  Cardiovascular: HR 59 asymptomatic bradycardic rate and regular rhythm at present (Hx A Fib) no murmur, gallop, rub. Abdomen: Soft, round, nontender, nondistended, normal bowel sounds. Labs:  Recent Labs     10/21/21  1113   HGB 15.2*   HCT 46.8   WBC 5.9   .2         K 4.7      CO2 29   BUN 7*   CREATININE 0.52   GLUCOSE 100*       No results for input(s): COVID19 in the last 720 hours.     MICHAEL Bonilla CNP   Electronically signed 11/15/2021 at 6:09 AM

## 2021-11-15 NOTE — PROGRESS NOTES
Pt has percocet escribed, and gabapentin RX faxed over to [facility] [place], [address]. Pt will resume eliquis post-op for DVT prophylaxis and continued treatment of AFIB.

## 2021-11-15 NOTE — CONSULTS
[facility].,    Adult Hospitalist      Name: Chandler Garner  MRN: [id number]     Acct: [de-identified]  Room: 2026/2026-01    Admit Date: 11/15/2021  5:33 AM  PCP: Jillian Hennessy MD    Primary Problem  Principal Problem:    Primary osteoarthritis of right knee  Resolved Problems:    * No resolved hospital problems. *        Assesment:     · Right total knee arthroplasty  · Right knee osteoarthritis  · Essential hypertension  · Hypothyroidism  · Intermittent asthma  · Paroxysmal atrial fibrillation  · Major depressive disorder  · Gastroesophageal reflux disease without esophagitis  · Obesity with BMI of 35  · Present smoker        Plan:     · Admit to MedSurg  · Monitor vitals closely  · Keep SPO2 above 90%  · I's and O's  · IV fluids  · Pain control  · Antiemetics as needed  · Resume home meds  · Labs if needed  · Incentive spirometer  · DVT and GI prophylaxis. Chief Complaint:     No chief complaint on file. Medical management    History of Present Illness:      Chandler Garner is a 78 y.o.  female who presents with No chief complaint on file. Patient admitted after undergoing right total knee arthroplasty without incident. Patient says pain has been in adequate control. She denies any nausea or vomiting. Presently patient denies any chest pain, dyspnea or orthopnea. Denies any abdominal pain, diarrhea or constipation. Denies any rash or headache. Denies any photophobia or diplopia. Denies any neck pain or back pain    I have personally reviewed the past medical history, past surgical history, medications, social history, and family history, and summarized in the note. Review of Systems:     All 10 point system is reviewed and negative otherwise mentioned in HPI.       Past Medical History:     Past Medical History:   Diagnosis Date    A-fib ([facility] [address]     Asthma     GERD (gastroesophageal reflux disease)     Thyroid disease         Past Surgical History:     Past Surgical History: Procedure Laterality Date    BLADDER SUSPENSION      BREAST BIOPSY      BREAST SURGERY Bilateral     augment    CARDIAC SURGERY      valve repair    CARDIOVERSION      COLONOSCOPY      HYSTERECTOMY      SHOULDER SURGERY Right     TONSILLECTOMY      TOTAL KNEE ARTHROPLASTY Right 11/15/2021    RIGHT KNEE TOTAL ARTHROPLASTY - DEPUY  CEMENTED performed by Jeanie Robbins MD at [address] [place] [address]        Medications Prior to Admission:       Prior to Admission medications    Medication Sig Start Date End Date Taking? Authorizing Provider   apixaban (ELIQUIS) 5 MG TABS tablet Take by mouth 2 times daily   Yes Historical Provider, MD   oxyCODONE-acetaminophen (PERCOCET) 5-325 MG per tablet Take 1 tablet by mouth every 4 hours as needed for Pain. Yes Historical Provider, MD   gabapentin (NEURONTIN) 300 MG capsule Take 300 mg by mouth 3 times daily.    Yes Historical Provider, MD   venlafaxine (EFFEXOR XR) 75 MG extended release capsule Take 75 mg by mouth daily  8/3/21  Yes Historical Provider, MD   buPROPion (WELLBUTRIN XL) 150 MG extended release tablet Take 150 mg by mouth every morning  10/6/21  Yes Historical Provider, MD   dilTIAZem [facility]) 180 MG extended release capsule Take 180 mg by mouth daily 10/20/21  Yes Historical Provider, MD   furosemide (LASIX) 20 MG tablet Take 20 mg by mouth daily as needed 10/5/21  Yes Historical Provider, MD   levothyroxine (SYNTHROID) 50 MCG tablet Take 50 mcg by mouth Daily  4/29/21  Yes Historical Provider, MD   mupirocin (BACTROBAN) 2 % ointment APPLY TO EACH NOSTRIL WITH A EVERY-TIP TWICE DAILY STARTING 5 DAYS PRIOR TO SUGERY 10/6/21  Yes Historical Provider, MD   sotalol (BETAPACE) 120 MG tablet Take 120 mg by mouth 2 times daily 10/20/21  Yes Historical Provider, MD   albuterol sulfate HFA (PROAIR HFA) 108 (90 Base) MCG/ACT inhaler Inhale 2 puffs into the lungs every 4 hours as needed 10/5/21 10/5/22 Yes Historical Provider, MD   omeprazole (PRILOSEC) 20 MG delayed release DDIMER, FV2QEPHO, LABABSO in the last 72 hours. Invalid input(s): PT  Chemistry:No results for input(s): NA, K, CL, CO2, GLUCOSE, BUN, CREATININE, MG, ANIONGAP, LABGLOM, GFRAA, CALCIUM, CAION, PHOS, PSA, PROBNP, TROPHS, CKTOTAL, CKMB, CKMBINDEX, MYOGLOBIN, DIGOXIN, LACTACIDWB in the last 72 hours. No results for input(s): PROT, LABALBU, LABA1C, E2LGIIN, G0GMOTJ, FT4, TSH, AST, ALT, LDH, GGT, ALKPHOS, LABGGT, BILITOT, BILIDIR, AMMONIA, AMYLASE, LIPASE, LACTATE, CHOL, HDL, LDLCHOLESTEROL, CHOLHDLRATIO, TRIG, VLDL, FPL06AX, PHENYTOIN, PHENYF, URICACID, POCGLU in the last 72 hours. No results found for: INR, PROTIME    Lab Results   Component Value Date/Time    SPECIAL NOT REPORTED 10/21/2021 11:39 AM     Lab Results   Component Value Date/Time    CULTURE NO SIGNIFICANT GROWTH 10/21/2021 11:39 AM       No results found for: POCPH, PHART, PH, POCPCO2, TRQ3FWH, PCO2, POCPO2, PO2ART, PO2, POCHCO3, VSB2QYT, HCO3, NBEA, PBEA, BEART, BE, THGBART, THB, BSY2CZS, YOTP6YMG, B4IWYCFH, O2SAT, FIO2    Radiology:    XR KNEE RIGHT (1-2 VIEWS)    Result Date: 11/15/2021  Status post total right knee arthroplasty. All radiological studies reviewed                Code Status:  Full Code    Electronically signed by Tod Goddard MD on 11/15/2021 at 5:53 PM     Copy sent to Dr. Hulon Siemens, MD    This note was created with the assistance of a speech-recognition program.  Although the intention is to generate a document that actually reflects the content of the visit, no guarantees can be provided that every mistake has been identified and corrected by editing. Note was updated later by me after  physical examination and  completion of the assessment.

## 2021-11-15 NOTE — OP NOTE
consent is now given to proceed with surgery as discussed. Procedure  After proper patient identification and marking of the surgical site in the preoperative area, and following update of the history and physical examination, the patient was brought to the operative suite and placed on the table in supine position. The patient was identified as Clearence Malia. An adequate anesthetic was induced. A tourniquet was applied to the thigh and then the lower extremity was prepped and draped free in the usual sterile fashion. After marking the skin, the knife was taken and the skin was incised in the anterior midline at the knee, deepening the incision through subcutaneous tissue to deep retinaculum. Minimal flap development was undertaken. A medial capsulotomy was made, not violating the quadriceps tendon, and the patella was tipped laterally to inspect the joint surfaces. At this time, meticulous hemostasis was undertaken. There was advanced tricompartmental degenerative change, warranting knee replacement as planned. Therefore, attention was first directed to the patella. The patellar articular surface was removed with a saw, and the patella was then sized for resurfacing and subluxated laterally. Attention was directed to the femur. The femoral cutting guide was used to resect the femur at the templated valgus angle and then the femoral guides were used to indicate the #5 narrow size. Next the remaining anterior and posterior as well as chamfer and femoral notch cuts were made. The trial femoral component was then fitted and noted to seat adequately. Now attention was directed to the tibia. The tibial cutting guide was used to resect the tibial plateau at the templated posterior slope, a few millimeters below the major tibial defect. The tibial sizers were used to indicate the #5 size.  Now, with these trial femoral and tibial components in place, the knee was articulated and moved through a full range of motion using the size 35 mm anatomic patellar button trial and the 10 mm trial tibial polyethylene spacer. Excellent stability and restoration of anatomic alignment as well as satisfactory patellar tracking were noted with this arrangement of trial components. The limb was then elevated and exsanguinated, and the tourniquet was inflated to 250 mmHg. Next the trial components were removed and the cut surfaces of bone were cleaned and dried while methylmethacrylate cement was prepared. The actual components selected for implantation were identical to the trial sizes used, and these were inserted and cemented in place in sequential order. After curing of the cement, the tourniquet was deflated after 9 minutes of total tourniquet time. Bleeding points were controlled by electrocautery, and the wound was then copiously irrigated and closed in a layerwise fashion by the first assistant, Yaneli Caceres DO, without the use of a drain. Sponge and needle counts were correct per the circulating nurse. Sterile dressings were applied and anesthesia was reversed. The patient was then able to be transported to the recovery room in good condition, having tolerated the procedure without complications and with approximately 100 mL of blood loss. The particulars of surgery as well as the condition of the patient postoperatively were discussed with the patients family thereafter.     Electronically signed by Amado Agudelo MD on 11/15/2021 at 10:09 AM

## 2021-11-15 NOTE — BRIEF OP NOTE
Brief Postoperative Note      Patient: Marta Parr  YOB: 1955  MRN: [id number]    Date of Procedure: 11/15/2021    Pre-Op Diagnosis: RIGHT KNEE OA    Post-Op Diagnosis: Same       Procedure(s):  RIGHT KNEE TOTAL ARTHROPLASTY - DEPUY  CEMENTED    Surgeon(s):  Jordin Christianson MD    Assistant:  Surgical Assistant: Neli Randle RN  Resident: Antony Marina DO    Anesthesia: General    Estimated Blood Loss (mL): less than 644     Complications: None    Specimens:   * No specimens in log *    Implants:  Implant Name Type Inv.  Item Serial No.  Lot No. LRB No. Used Action   CEMENT BNE 40GM FULL DOSE PMMA W/O ANTIBIO HI VISC N RADPQ  CEMENT BNE 40GM FULL DOSE PMMA W/O ANTIBIO HI VISC N RADPQ  [employer] [facility] [id number] Right 1 Implanted   BASEPLATE TIB SZ 5 FIX BEAR RAUL S+ TECHNOLOGY ATTUNE  BASEPLATE TIB SZ 5 FIX BEAR RAUL S+ TECHNOLOGY ATTUNE  [employer] [facility] [id number] Right 1 Implanted   COMPONENT FEM SZ 5 R KNEE DARIO POST STBL RAUL ATTUNE  COMPONENT FEM SZ 5 R KNEE DARIO POST STBL RAUL ATTUNE  [employer] [facility] [id number] Right 1 Implanted   COMPONENT PAT [id number] KNEE POLY RAUL MEDIALIZED BENJAMIN ATTUNE  COMPONENT PAT [id number] KNEE POLY RAUL MEDIALIZED BENJAMIN ATTUNE  [employer] [facility] [id number] Right 1 Implanted   INSERT TIB SZ 5 [id number] KNEE CRUCE RET FIX BEAR ATTUNE  INSERT TIB SZ 5 [id number] KNEE CRUCE RET FIX BEAR ATTUNE  [employer] [facility] [id number] Right 1 Implanted         Drains: * No LDAs found *    Findings: SEVERE OA RIGHT KNEE    Electronically signed by Jordin Christianson MD on 11/15/2021 at 9:59 AM

## 2021-11-15 NOTE — PLAN OF CARE
Problem: Falls - Risk of:  Goal: Will remain free from falls  Description: Will remain free from falls  Outcome: Ongoing  Goal: Absence of physical injury  Description: Absence of physical injury  Outcome: Ongoing     Problem: Discharge Planning:  Goal: Discharged to appropriate level of care  Description: Discharged to appropriate level of care  Outcome: Ongoing     Problem: Mobility - Impaired:  Goal: Mobility will improve  Description: Mobility will improve  Outcome: Ongoing     Problem: Infection - Surgical Site:  Goal: Will show no infection signs and symptoms  Description: Will show no infection signs and symptoms  Outcome: Ongoing     Problem: Pain - Acute:  Goal: Pain level will decrease  Description: Pain level will decrease  Outcome: Ongoing   Patient is a fall risk during this admission. Fall risk assessment was performed. Patient is absent of falls. Bed is in the lowest position. Wheels on the bed are locked. Call light and bed side table are within reach. Clutter is removed. Patient was educated to call out when needing assistance or wanting to get out of bed. Patient offered toileting assistance during rounding. Hourly rounds have been performed. Pt medicated with pain medication prn. Assessed all pain characteristics including level, type, location, frequency, and onset. Non-pharmacologic interventions offered to pt as well. Pt states pain is tolerable at this time.  Will continue to monitor

## 2021-11-15 NOTE — PHYSICIAN ADVISORY
The patient was counseled at length about the risks of mook Covid-19 during their perioperative period and any recovery window from their procedure. The patient was made aware that mook Covid-19  may worsen their prognosis for recovering from their procedure  and lend to a higher morbidity and/or mortality risk. All material risks, benefits, and reasonable alternatives including postponing the procedure were discussed. The patient does  wish to proceed with the procedure at this time.

## 2021-11-15 NOTE — ANESTHESIA POSTPROCEDURE EVALUATION
Department of Anesthesiology  Postprocedure Note    Patient: Kar Alvarenga  MRN: [id number]  YOB: 1955  Date of evaluation: 11/15/2021  Time:  11:51 AM     Procedure Summary     Date: 11/15/21 Room / Location: [address] [place] [address] / [facility] - INPATIENT    Anesthesia Start: 8244 Anesthesia Stop: 8022    Procedure: RIGHT KNEE TOTAL ARTHROPLASTY - DEPUY  CEMENTED (Right Knee) Diagnosis: (DX RIGHT KNEE OA)    Surgeons: Rory Gabriel MD Responsible Provider: Jamison Mendoza MD    Anesthesia Type: general ASA Status: 3          Anesthesia Type: general    Paulina Phase I: Paulina Score: 8    Paulina Phase II:      Last vitals: Reviewed and per EMR flowsheets.        Anesthesia Post Evaluation    Complications: no

## 2021-11-15 NOTE — PROGRESS NOTES
Direct oral anticoagulant (DOAC) - Initial Pharmacy Review  PLAN    No obvious intervention needed. Joselo Abisai [facility]  11/15/2021  3:29 PM    ASSESSMENT   Patient chart reviewed for indication and appropriateness of dose and therapy of Apixaban.:  Indication: AFib. PATIENT INFORMATION   Body mass index is 35.85 kg/m². Wt Readings from Last 1 Encounters:   11/15/21 196 lb (88.9 kg)     Some sources recommend that DOACs be avoided in weight < 50 or > 120 kg, or BMI > 40 whenever possible; however. some smaller studies suggest that DOACs may be safe and efficacious in this population. No results for input(s): CREATININE in the last 72 hours. No results for input(s): HGB, HCT, PLT in the last 72 hours. No results for input(s): INR in the last 72 hours. Weight  kg ? BMI Less than   40 kg/m2 Calculated CrCl  Using ABW (mL/min) Other anticoagulants on MAR Drug interactions  (since admission) reviewed   yes    Wt Readings from Last 1 Encounters:   11/15/21 196 lb (88.9 kg)    yes    Body mass index is 35.85 kg/m². Apixaban for Afib is reviewed separately        (140-age)*ABW    72 * sCr    X 0.85 for females No concurrent anticoagulants ordered.  No interactions/no new drug interactions identified requiring action.                 -----------------------------------------------------------------------------------------------------------------    CRCL Calculation for DOACs  (use ABW)    CrCl male:  (140-Age) * ABW           For female:  (140-Age) * ABW * 0.85                       72  *  sCr                                              72 * sCr           Apixaban (Eliquis)  Indication Dose (Oral) Renal Adjustment (CrCl calculated on ABW) [facility] Drug Interactions   NVAF 5mg BID 2.5mg BID  IF patient has TWO of the following:  Age>80, Weight <60 kg, SCr > 1.5  (If dialysis, but not meeting above criteria, keep 5 mg BID*)   Strong P-gp/CY inducers (Avoid combination)  Apalutamide, CarBAMazepine,

## 2021-11-15 NOTE — CARE COORDINATION
Spoke to Julius Moffett with [facility] and script for walker, face sheet and face to face note faxed. Will be delivered to room tomorrow.

## 2021-11-15 NOTE — PROGRESS NOTES
Faxed over order for EUNICE FWW to Renny at [facility]. Renny unable to accept d/t pt lives in [place] and is [employer] OOS. Gave RX to Caron Finley.

## 2021-11-16 VITALS
HEART RATE: 76 BPM | RESPIRATION RATE: 16 BRPM | SYSTOLIC BLOOD PRESSURE: 131 MMHG | TEMPERATURE: 97.8 F | DIASTOLIC BLOOD PRESSURE: 75 MMHG | OXYGEN SATURATION: 93 % | HEIGHT: 62 IN | WEIGHT: 211 LBS | BODY MASS INDEX: 38.83 KG/M2

## 2021-11-16 PROBLEM — M17.11 PRIMARY OSTEOARTHRITIS OF RIGHT KNEE: Chronic | Status: RESOLVED | Noted: 2021-11-15 | Resolved: 2021-11-16

## 2021-11-16 PROCEDURE — 6370000000 HC RX 637 (ALT 250 FOR IP): Performed by: ORTHOPAEDIC SURGERY

## 2021-11-16 PROCEDURE — 2580000003 HC RX 258: Performed by: ORTHOPAEDIC SURGERY

## 2021-11-16 PROCEDURE — 97535 SELF CARE MNGMENT TRAINING: CPT

## 2021-11-16 PROCEDURE — 97116 GAIT TRAINING THERAPY: CPT

## 2021-11-16 PROCEDURE — 97530 THERAPEUTIC ACTIVITIES: CPT

## 2021-11-16 RX ADMIN — OXYCODONE 5 MG: 5 TABLET ORAL at 00:57

## 2021-11-16 RX ADMIN — SODIUM CHLORIDE, POTASSIUM CHLORIDE, SODIUM LACTATE AND CALCIUM CHLORIDE: 600; 310; 30; 20 INJECTION, SOLUTION INTRAVENOUS at 01:11

## 2021-11-16 RX ADMIN — OXYCODONE 5 MG: 5 TABLET ORAL at 04:57

## 2021-11-16 RX ADMIN — ACETAMINOPHEN 650 MG: 325 TABLET ORAL at 08:38

## 2021-11-16 RX ADMIN — SOTALOL HYDROCHLORIDE 120 MG: 80 TABLET ORAL at 08:38

## 2021-11-16 RX ADMIN — VENLAFAXINE HYDROCHLORIDE 75 MG: 75 CAPSULE, EXTENDED RELEASE ORAL at 08:38

## 2021-11-16 RX ADMIN — BUPROPION HYDROCHLORIDE 150 MG: 150 TABLET, EXTENDED RELEASE ORAL at 08:38

## 2021-11-16 RX ADMIN — POLYETHYLENE GLYCOL 3350 17 G: 17 POWDER, FOR SOLUTION ORAL at 08:38

## 2021-11-16 RX ADMIN — MULTIPLE VITAMINS W/ MINERALS TAB 1 TABLET: TAB at 08:38

## 2021-11-16 RX ADMIN — OXYCODONE 10 MG: 5 TABLET ORAL at 08:38

## 2021-11-16 RX ADMIN — OXYCODONE 5 MG: 5 TABLET ORAL at 12:39

## 2021-11-16 RX ADMIN — APIXABAN 5 MG: 5 TABLET, FILM COATED ORAL at 08:38

## 2021-11-16 RX ADMIN — GABAPENTIN 300 MG: 300 CAPSULE ORAL at 08:38

## 2021-11-16 RX ADMIN — PANTOPRAZOLE SODIUM 40 MG: 40 TABLET, DELAYED RELEASE ORAL at 06:17

## 2021-11-16 RX ADMIN — DILTIAZEM HYDROCHLORIDE 180 MG: 180 CAPSULE, COATED, EXTENDED RELEASE ORAL at 08:38

## 2021-11-16 RX ADMIN — LEVOTHYROXINE SODIUM 50 MCG: 0.05 TABLET ORAL at 06:08

## 2021-11-16 RX ADMIN — ACETAMINOPHEN 650 MG: 325 TABLET ORAL at 04:57

## 2021-11-16 ASSESSMENT — PAIN DESCRIPTION - PAIN TYPE
TYPE: SURGICAL PAIN

## 2021-11-16 ASSESSMENT — PAIN DESCRIPTION - FREQUENCY
FREQUENCY: CONTINUOUS
FREQUENCY: INTERMITTENT
FREQUENCY: CONTINUOUS
FREQUENCY: INTERMITTENT

## 2021-11-16 ASSESSMENT — PAIN DESCRIPTION - DESCRIPTORS
DESCRIPTORS: DULL;SORE
DESCRIPTORS: DULL;HEAVINESS
DESCRIPTORS: SORE
DESCRIPTORS: SORE

## 2021-11-16 ASSESSMENT — PAIN DESCRIPTION - LOCATION
LOCATION: KNEE

## 2021-11-16 ASSESSMENT — PAIN DESCRIPTION - ONSET
ONSET: ON-GOING
ONSET: ON-GOING

## 2021-11-16 ASSESSMENT — PAIN DESCRIPTION - PROGRESSION
CLINICAL_PROGRESSION: GRADUALLY IMPROVING
CLINICAL_PROGRESSION: GRADUALLY IMPROVING
CLINICAL_PROGRESSION: NOT CHANGED
CLINICAL_PROGRESSION: NOT CHANGED

## 2021-11-16 ASSESSMENT — PAIN DESCRIPTION - ORIENTATION
ORIENTATION: RIGHT

## 2021-11-16 ASSESSMENT — PAIN SCALES - GENERAL
PAINLEVEL_OUTOF10: 4
PAINLEVEL_OUTOF10: 6
PAINLEVEL_OUTOF10: 5
PAINLEVEL_OUTOF10: 4
PAINLEVEL_OUTOF10: 3
PAINLEVEL_OUTOF10: 4
PAINLEVEL_OUTOF10: 6

## 2021-11-16 ASSESSMENT — PAIN - FUNCTIONAL ASSESSMENT
PAIN_FUNCTIONAL_ASSESSMENT: PREVENTS OR INTERFERES SOME ACTIVE ACTIVITIES AND ADLS
PAIN_FUNCTIONAL_ASSESSMENT: PREVENTS OR INTERFERES SOME ACTIVE ACTIVITIES AND ADLS

## 2021-11-16 NOTE — PLAN OF CARE
Problem: Falls - Risk of:  Goal: Will remain free from falls  Description: Will remain free from falls  11/16/2021 1214 by Jesenia Carson RN  Outcome: Ongoing  11/16/2021 0051 by Keyon David RN  Outcome: Ongoing  Goal: Absence of physical injury  Description: Absence of physical injury  11/16/2021 1214 by Jesenia Carson RN  Outcome: Ongoing  11/16/2021 0051 by Keyon David RN  Outcome: Ongoing     Problem: Discharge Planning:  Goal: Discharged to appropriate level of care  Description: Discharged to appropriate level of care  Outcome: Ongoing     Problem: Mobility - Impaired:  Goal: Mobility will improve  Description: Mobility will improve  Outcome: Ongoing     Problem: Infection - Surgical Site:  Goal: Will show no infection signs and symptoms  Description: Will show no infection signs and symptoms  Outcome: Ongoing     Problem: Pain - Acute:  Goal: Pain level will decrease  Description: Pain level will decrease  Outcome: Ongoing     Problem: Pain:  Goal: Pain level will decrease  Description: Pain level will decrease  Outcome: Ongoing  Goal: Control of acute pain  Description: Control of acute pain  Outcome: Ongoing  Goal: Control of chronic pain  Description: Control of chronic pain  Outcome: Ongoing   Patient is a fall risk during this admission. Fall risk assessment was performed. Patient is absent of falls. Bed is in the lowest position. Wheels on the bed are locked. Call light and bed side table are within reach. Clutter is removed. Patient was educated to call out when needing assistance or wanting to get out of bed. Patient offered toileting assistance during rounding. Hourly rounds have been performed. Pt medicated with pain medication prn. Assessed all pain characteristics including level, type, location, frequency, and onset. Non-pharmacologic interventions offered to pt as well. Pt states pain is tolerable at this time.  Will continue to monitor

## 2021-11-16 NOTE — PROGRESS NOTES
Occupational Therapy  Facility/Department: [facility]  Daily Treatment Note  NAME: Marta Parr  : 1955  MRN: [id number]    Date of Service: 2021    Discharge Recommendations:  Patient would benefit from continued therapy after discharge  OT Equipment Recommendations  ADL Assistive Devices: Sock-Aid Hard; Reacher; Long-handled Sponge; Dressing Stick    Assessment   Performance deficits / Impairments: Decreased functional mobility ; Decreased safe awareness; Decreased balance; Decreased ADL status; Decreased cognition; Decreased endurance; Decreased strength; Decreased sensation  Assessment: Pt. completed showering task with SBA with use of RW. Pt. completed bathing/dressing task with supervision. Pt and daughter instructed on donning NANI hose and completed with success. Skilled OT warranted to promote I/safety to return pt to prior living arrangements with assist as needed. Prognosis: Good  OT Education: OT Role; Plan of Care; Precautions; ADL Adaptive Strategies; Transfer Training; Energy Conservation; Family Education; Equipment  Patient Education: Pt. educated on  home safety and importance of mobility to increase functional endurance for home management. REQUIRES OT FOLLOW UP: Yes  Activity Tolerance  Activity Tolerance: Patient Tolerated treatment well  Activity Tolerance: fair+  Safety Devices  Safety Devices in place: Yes  Type of devices: All fall risk precautions in place; Nurse notified; Gait belt; Patient at risk for falls; Left in bed; Call light within reach         Patient Diagnosis(es): There were no encounter diagnoses. has a past medical history of A-fib ([address] Asthma, GERD (gastroesophageal reflux disease), and Thyroid disease. has a past surgical history that includes Hysterectomy; Breast surgery (Bilateral); shoulder surgery (Right); Cardiac surgery; bladder suspension; Breast biopsy;  Tonsillectomy; Colonoscopy; Cardioversion; and Total knee arthroplasty (Right, 11/15/2021). Restrictions  Restrictions/Precautions  Restrictions/Precautions: General Precautions, Weight Bearing, Fall Risk  Required Braces or Orthoses?: No  Lower Extremity Weight Bearing Restrictions  Right Lower Extremity Weight Bearing: Weight Bearing As Tolerated  Position Activity Restriction  Other position/activity restrictions: LUE IV, polar care, B Knee High NANI hose  Subjective   General  Chart Reviewed: Yes  Patient assessed for rehabilitation services?: Yes  Response to previous treatment: Patient with no complaints from previous session  Family / Caregiver Present: Yes (daughter present)  Subjective  Subjective: \"Yes I would like to take a shower\"  General Comment  Comments: Pt. agreeable for treatment      Orientation  Orientation  Overall Orientation Status: Within Normal Limits  Objective    ADL  Feeding: Modified independent   Grooming: Setup  UE Bathing: Setup (Pt. required set up for showering task)  LE Bathing: Supervision  UE Dressing: Independent  LE Dressing: Independent (independent seated in chair/ SBA to pull clothing to waist)  Toileting: Supervision  Additional Comments: Pt. completed showering task with success with use of RW for transfer from EOB to shower bench supervised        Balance  Sitting Balance: Independent  Standing Balance: Stand by assistance  Standing Balance  Time: 3-4 min  Activity: ADL transfers  Functional Mobility  Functional - Mobility Device: Rolling Walker  Assist Level: Stand by assistance  Functional Mobility Comments: SBA x1 for all functional transfers with use of RW  Shower Transfers  Shower - Transfer From: Claryce Corolla - Transfer Type: To and From  Shower - Transfer To: Shower seat with back  Shower - Technique:  To right  Shower Transfers: Stand by assistance; Supervision  Bed mobility  Supine to Sit: Stand by assistance  Sit to Supine: Stand by assistance  Scooting: Stand by assistance  Transfers  Stand Step Transfers: Stand by assistance  Sit to Minutes 45              RN reports patient is medically stable for therapy treatment this date. Chart reviewed prior to treatment and patient is agreeable for therapy. All lines intact and patient positioned comfortably at end of treatment. All patient needs addressed prior to ending therapy session.       VANIA Cordoba

## 2021-11-16 NOTE — DISCHARGE SUMMARY
[address] SUMMARY    Patient: Susana Rubio         Reg#: [id number]     YOB: 1955    Date of  Admission & Surgery: 11/15/2021 Date of Discharge: 11/16/2021    Attending Surgeon: Ronald Espino M.D.  PCP: Hi Thao MD        History and Hospital Course    The patient has had severe pain and arthritis of the knee, unresponsive to conservative treatment and is now admitted for joint replacement. Medical evaluation and postoperative co-management is provided by the consultant medical staff. The patient underwent knee replacement on the day of admission, 11/15/2021. The operation was well tolerated by the patient, without any complication. Postoperatively the patient did well, was never grossly febrile, and was able to participate actively in a supervised program of physical therapy for gait training, transfers and joint range of motion. Postoperative hemoglobin remained stable, not requiring transfusion. Wound healing ensued without difficulty and without sign of infection. The patient experienced no other problem or complication during this admission and was considered a satisfactory candidate for discharge. Disposition    The patient was able to be discharged home postoperatively on 11/16/2021, POD # 1, in good condition, with instructions to maintain activity and participation in therapy with total joint precautions. Prescription was given for Percocet 5/325 one q4h prn for pain, for later home use, and a specific regimen was prescribed for continued thromboembolic prophylaxis with her usual Eliquis, after discharge. The patient was also advised to follow up in my office within 2 weeks for re-evaluation.     Final diagnosis: Osteoarthritis of the knee  Procedure performed: Right total knee replacement  Complications: None  Disposition: Home

## 2021-11-16 NOTE — PROGRESS NOTES
Walker  Assistance: Contact guard assistance  Quality of Gait: step-to pattern  Gait Deviations: Decreased step length; Decreased step height  Distance: 20 ft  Ambulation 2  Surface - 2: level tile  Device 2: Rolling Walker  Assistance 2: Contact guard assistance  Quality of Gait 2: gait steady   Gait Deviations: Decreased step length; Decreased step height  Distance: 150 ft  Ambulation 3  Surface - 3: level tile  Device 3: Rolling Walker  Assistance 3: Contact guard assistance  Quality of Gait 3: good sequencing  Gait Deviations: Decreased step length; Decreased step height  Distance: 150 ft  Stairs/Curb  Stairs?: Yes  Stairs  # Steps : 10  Stairs Height: 6\" (8\")  Rails: Bilateral     Balance  Posture: Good  Sitting - Static: Good  Sitting - Dynamic: Good  Standing - Static: Good  Standing - Dynamic: Good  Exercises  Comments: reviewed TKA ex                         G-Code     OutComes Score                                                     AM-PAC Score  AM-PAC Inpatient Mobility Raw Score : 21 (11/16/21 1530)  AM-PAC Inpatient T-Scale Score : 50.25 (11/16/21 1530)  Mobility Inpatient CMS 0-100% Score: 28.97 (11/16/21 1530)  Mobility Inpatient CMS G-Code Modifier : CJ (11/16/21 1530)          Goals  Short term goals  Time Frame for Short term goals: 10 visits  Short term goal 1: Pt to demonstrate bed mobility Lyndsey  Short term goal 2: Pt to perform STS transfers w/ RW CGA  Short term goal 3: Pt to ambulate at least 150ft w/ RW CGA  Short term goal 4: Pt to ascend/descend 4 stairs w/ non-reciprocal stair pattern Seun  Short term goal 5: Pt to be independent w/ TKA HEP  Patient Goals   Patient goals :  To go home, to recover    Plan    Plan  Times per week: BID; 7x/week  Current Treatment Recommendations: Strengthening, ROM, Balance Training, Functional Mobility Training, Stair training, Gait Training, Transfer Training, Endurance Training, Safety Education & Training, Home Exercise Program, Equipment Evaluation, Education, & procurement, Patient/Caregiver Education & Training, Neuromuscular Re-education  Safety Devices  Type of devices: Bed alarm in place, Call light within reach, Gait belt, Nurse notified, Left in bed  Restraints  Initially in place: No   returned to see pt from [phone] for practicing stairs SBA/CGA for negotiating 10 steps  Therapy Time   Individual Concurrent Group Co-treatment   Time In  909/958         Time Out  920/1011         Minutes  11+13=24               Total treatment time 24 minutes  MICHELL FRANKEL, JAZMYNE                    Treatment & documentation completed by Ning Anderson, Physical Therapist Assistant    co-signed by Orlando Garcia, PT

## 2021-11-16 NOTE — CARE COORDINATION
POD #1 rt total knee replacement. Pt lives alone and daughter will be staying with pt for 2-3 day post op.  walker ordered from [facility] and will be delivered to patients home. Pt will be going to OP therapy in [place], [place].   Post op appointment with Dr. Mar Nielsen at [place] [address].

## 2021-11-16 NOTE — PROGRESS NOTES
Ortho POD # 1 and Discharge    Feels fine, pain fairly well controlled orally  Afebrile, Vital signs stable  Waterproof dressing intact; no skin redness at the site  Neurovascular findings normal, negative Brandon sign  Okay for discharge today  Follow up as scheduled  Pain medication Rx electronically dispensed; she is to resume her Eliquis for postop VTE prophylaxis

## 2021-11-16 NOTE — PROGRESS NOTES
Patient reviewed and signed discharge instructions with RN. Home with daughter in stable condition. Transported to [facility] with all belongings via wheelchair with PCT.

## 2021-11-17 PROCEDURE — 64415 NJX AA&/STRD BRCH PLXS IMG: CPT | Performed by: ANESTHESIOLOGY

## 2021-11-17 RX ORDER — BUPIVACAINE HYDROCHLORIDE 5 MG/ML
INJECTION, SOLUTION EPIDURAL; INTRACAUDAL
Status: DISCONTINUED | OUTPATIENT
Start: 2021-11-17 | End: 2021-11-17 | Stop reason: SDUPTHER

## 2021-11-17 RX ADMIN — BUPIVACAINE 10 ML: 13.3 INJECTION, SUSPENSION, LIPOSOMAL INFILTRATION at 10:29

## 2021-11-17 RX ADMIN — BUPIVACAINE HYDROCHLORIDE 20 ML: 5 INJECTION, SOLUTION EPIDURAL; INTRACAUDAL; PERINEURAL at 10:29

## 2021-11-17 NOTE — ADDENDUM NOTE
Addendum  created 11/17/21 1030 by Thang Cunha MD    Child order released for a procedure order, Clinical Note Signed, Diagnosis association updated, Intraprocedure Blocks edited

## 2021-11-17 NOTE — ANESTHESIA PROCEDURE NOTES
Peripheral Block    Patient location during procedure: pre-op  Start time: 11/15/2021 7:00 AM  End time: 11/15/2021 7:10 AM  Staffing  Performed: anesthesiologist   Preanesthetic Checklist  Completed: patient identified, IV checked, site marked, risks and benefits discussed, surgical consent, monitors and equipment checked, pre-op evaluation, timeout performed, anesthesia consent given, oxygen available and patient being monitored  Peripheral Block  Patient position: sitting  Prep: ChloraPrep  Patient monitoring: cardiac monitor, continuous pulse ox, frequent blood pressure checks and IV access  Block type: Saphenous  Laterality: right  Injection technique: single-shot  Guidance: ultrasound guided  Local infiltration: lidocaine  Infiltration strength: 1 %  Dose: 3 mL  Provider prep: mask and sterile gloves  Local infiltration: lidocaine  Needle  Needle gauge: 21 G  Needle length: 10 cm  Needle localization: ultrasound guidance  Assessment  Injection assessment: negative aspiration for heme, no paresthesia on injection and local visualized surrounding nerve on ultrasound  Paresthesia pain: none  Slow fractionated injection: yes  Hemodynamics: stable  Additional Notes  U/S [id number].  (1) Under ultrasound guidance, a  gauge needle was inserted and placed in close proximity to the nerve.  (2) Ultrasound was also used to visualize the spread of the anesthetic in close proximity to the nerve being blocked. (3) The nerve appeared anatomically normal, and (4 there were no apparent abnormal pathological findings on the image that were readily visible and related to the nerve being blocked. (5) A permanent ultrasound image was saved in the patient's record.             Medications Administered  Bupivacaine (PF) (MARCAINE) 0.5 % injection, 20 mL  bupivacaine liposome (EXPAREL) 1.3 % injection, 10 mL  Reason for block: post-op pain management and at surgeon's request

## 2023-11-14 ENCOUNTER — HOSPITAL ENCOUNTER (OUTPATIENT)
Dept: PREADMISSION TESTING | Age: 68
Discharge: HOME | End: 2023-11-18
Payer: COMMERCIAL

## 2023-11-14 VITALS
RESPIRATION RATE: 18 BRPM | HEIGHT: 62 IN | TEMPERATURE: 97 F | SYSTOLIC BLOOD PRESSURE: 122 MMHG | OXYGEN SATURATION: 100 % | DIASTOLIC BLOOD PRESSURE: 66 MMHG | WEIGHT: 202.38 LBS | HEART RATE: 79 BPM | BODY MASS INDEX: 37.24 KG/M2

## 2023-11-14 LAB
ANION GAP SERPL CALCULATED.3IONS-SCNC: 9 MMOL/L (ref 9–17)
BUN SERPL-MCNC: 12 MG/DL (ref 8–23)
BUN/CREAT SERPL: 15 (ref 9–20)
CALCIUM SERPL-MCNC: 9.7 MG/DL (ref 8.6–10.4)
CHLORIDE SERPL-SCNC: 97 MMOL/L (ref 98–107)
CO2 SERPL-SCNC: 31 MMOL/L (ref 20–31)
CREAT SERPL-MCNC: 0.8 MG/DL (ref 0.5–0.9)
ERYTHROCYTE [DISTWIDTH] IN BLOOD BY AUTOMATED COUNT: 13.4 % (ref 11.8–14.4)
GFR SERPL CREATININE-BSD FRML MDRD: >60 ML/MIN/1.73M2
GLUCOSE SERPL-MCNC: 99 MG/DL (ref 70–99)
HCT VFR BLD AUTO: 39.2 % (ref 36.3–47.1)
HGB BLD-MCNC: 12.5 G/DL (ref 11.9–15.1)
INR PPP: 1.2
MCH RBC QN AUTO: 33.6 PG (ref 25.2–33.5)
MCHC RBC AUTO-ENTMCNC: 31.9 G/DL (ref 28.4–34.8)
MCV RBC AUTO: 105.4 FL (ref 82.6–102.9)
NRBC BLD-RTO: 0 PER 100 WBC
PARTIAL THROMBOPLASTIN TIME: 33.3 SEC (ref 23.9–33.8)
PLATELET # BLD AUTO: 454 K/UL (ref 138–453)
PMV BLD AUTO: 9.1 FL (ref 8.1–13.5)
POTASSIUM SERPL-SCNC: 5 MMOL/L (ref 3.7–5.3)
PROTHROMBIN TIME: 14.6 SEC (ref 11.5–14.2)
RBC # BLD AUTO: 3.72 M/UL (ref 3.95–5.11)
SODIUM SERPL-SCNC: 137 MMOL/L (ref 135–144)
WBC OTHER # BLD: 9.7 K/UL (ref 3.5–11.3)

## 2023-11-14 PROCEDURE — 85730 THROMBOPLASTIN TIME PARTIAL: CPT

## 2023-11-14 PROCEDURE — 80048 BASIC METABOLIC PNL TOTAL CA: CPT

## 2023-11-14 PROCEDURE — 85610 PROTHROMBIN TIME: CPT

## 2023-11-14 PROCEDURE — 36415 COLL VENOUS BLD VENIPUNCTURE: CPT

## 2023-11-14 PROCEDURE — 85027 COMPLETE CBC AUTOMATED: CPT

## 2023-11-14 PROCEDURE — 87641 MR-STAPH DNA AMP PROBE: CPT

## 2023-11-14 RX ORDER — ACETAMINOPHEN 500 MG
1000 TABLET ORAL ONCE
OUTPATIENT
Start: 2023-12-07

## 2023-11-14 RX ORDER — GABAPENTIN 300 MG/1
300 CAPSULE ORAL ONCE
OUTPATIENT
Start: 2023-12-07

## 2023-11-14 RX ORDER — ATORVASTATIN CALCIUM 40 MG/1
40 TABLET, FILM COATED ORAL DAILY
COMMUNITY

## 2023-11-14 RX ORDER — METOPROLOL SUCCINATE 50 MG/1
50 TABLET, EXTENDED RELEASE ORAL DAILY
COMMUNITY

## 2023-11-14 RX ORDER — ASCORBIC ACID 500 MG
1000 TABLET ORAL DAILY
COMMUNITY

## 2023-11-14 RX ORDER — OLMESARTAN MEDOXOMIL 40 MG/1
40 TABLET ORAL DAILY
COMMUNITY

## 2023-11-14 RX ORDER — LANOLIN ALCOHOL/MO/W.PET/CERES
1000 CREAM (GRAM) TOPICAL DAILY
COMMUNITY

## 2023-11-14 RX ORDER — MONTELUKAST SODIUM 10 MG/1
10 TABLET ORAL NIGHTLY
COMMUNITY

## 2023-11-14 RX ORDER — FOLIC ACID 1 MG/1
1 TABLET ORAL DAILY
COMMUNITY

## 2023-11-14 RX ORDER — POTASSIUM CHLORIDE 1.5 G/1.58G
20 POWDER, FOR SOLUTION ORAL DAILY PRN
COMMUNITY

## 2023-11-14 RX ORDER — ACETAMINOPHEN 500 MG
1000 TABLET ORAL EVERY 8 HOURS PRN
COMMUNITY

## 2023-11-14 RX ORDER — CELECOXIB 200 MG/1
200 CAPSULE ORAL ONCE
OUTPATIENT
Start: 2023-12-07

## 2023-11-14 RX ORDER — CALCIUM CARBONATE 500(1250)
500 TABLET ORAL DAILY
COMMUNITY

## 2023-11-14 ASSESSMENT — PAIN DESCRIPTION - DESCRIPTORS: DESCRIPTORS: ACHING

## 2023-11-14 ASSESSMENT — PAIN DESCRIPTION - ORIENTATION: ORIENTATION: LEFT

## 2023-11-14 ASSESSMENT — PAIN SCALES - GENERAL: PAINLEVEL_OUTOF10: 5

## 2023-11-14 ASSESSMENT — PAIN DESCRIPTION - LOCATION: LOCATION: SHOULDER

## 2023-11-14 ASSESSMENT — PAIN DESCRIPTION - PAIN TYPE: TYPE: CHRONIC PAIN

## 2023-11-14 ASSESSMENT — PAIN DESCRIPTION - FREQUENCY: FREQUENCY: CONTINUOUS

## 2023-11-14 NOTE — H&P
observed you stop breathing or choking/gasping during your sleep? No  4) Do you have hypertension? yes  5) BMI >35 kg/m2? Yes  6) Age > 48? Yes  7) Pt is a male? No    Functional Capacity:  1) Pt is not able to walk 2 city blocks on level ground without SOB. 2) Pt is not able to climb 2 flights of stairs without SOB. 3) Pt is not able to walk up a hill for 1-2 city blocks without SOB. Transthoracic echo complete with contrast, bubble, and 3D PRN 11/1/2023:    Left ventricle cavity size is normal. Left ventricular systolic function is in the normal range. Visually estimated EF is 60%. No regional LV wall motion abnormalities noted. Left ventricle mild concentric hypertrophy. Right ventricle cavity is normal. Right ventricular systolic function is normal. TAPSE 18 mm. No significant valve abnormalities. Compared to the prior study on 3/23/2022: Results are similar. Cardiac monitor 11/8/2023:  The patient wore an extended holter monitor and was monitored for 7 days. The patient was in sinus rhythm throughout with an average heart rate of   93 bpm.   The minimum heart rate was 71 bpm and the maximum was 113 bpm.   There were rare PVC's noted. There was 1 run of non-sustained ventricular   tachycardia, being 6 beats at 101 bpm.   There were abundant PAC's noted with 5,996 runs of non-sustained SVT   noted, the longest being 21 beats at 165 bpm.   The patient reported feeling light headedness, nausea, and fatigue which   correlated to sinus rhythm.      Past Medical History:     Past Medical History:   Diagnosis Date    A-fib [facility])     sees Dr Ric Schmidt at [facility]    Asthma     COPD (chronic obstructive pulmonary disease) [facility])     Dr Mirna Haas- sees Pulmonologist at [facility]    Depression     Diverticulitis     GERD (gastroesophageal reflux disease)     Hyperlipidemia     Murmur, cardiac     possible- had years ago    Thyroid disease     Umbilical hernia

## 2023-11-14 NOTE — PRE-PROCEDURE INSTRUCTIONS
other nonmedical public transportation is not acceptable unless you have someone to ride home in the vehicle with you. For your safety, someone must remain with you for the first 24 hours after your surgery if you receive anesthesia or medication. If you do not have someone to stay with you, your procedure may be cancelled. As a patient at [facility] you can expect quality medical and nursing care that is centered on you individual needs. Our goal is to make your surgical experience as comfortable as possible.     Any questions about preparing for your surgery please call [phone].      ____________________________   ____________________________  Signature (Patient)                                 Signature (Nurse)                     Date

## 2023-11-15 NOTE — FLOWSHEET NOTE
11/15/23 1555   Anesthesia PAT Clearance   Anesthesia Review Status Anes has reviewed patient for surgery  (Dr. Mike Henley reviewed history and wants cardiac clearance and Ailyn Mosley from Dr. Torres Interiano office was notified and she will send a request)

## 2023-11-16 LAB
MRSA, DNA, NASAL: NEGATIVE
SPECIMEN DESCRIPTION: NORMAL

## 2023-12-07 ENCOUNTER — HOSPITAL ENCOUNTER (OUTPATIENT)
Age: 68
Discharge: HOME | End: 2023-12-07
Attending: ORTHOPAEDIC SURGERY | Admitting: ORTHOPAEDIC SURGERY
Payer: COMMERCIAL

## 2023-12-07 ENCOUNTER — ANESTHESIA EVENT (OUTPATIENT)
Dept: OPERATING ROOM | Age: 68
End: 2023-12-07
Payer: COMMERCIAL

## 2023-12-07 ENCOUNTER — ANESTHESIA (OUTPATIENT)
Dept: OPERATING ROOM | Age: 68
End: 2023-12-07
Payer: COMMERCIAL

## 2023-12-07 VITALS
SYSTOLIC BLOOD PRESSURE: 106 MMHG | BODY MASS INDEX: 37.17 KG/M2 | OXYGEN SATURATION: 92 % | HEART RATE: 72 BPM | WEIGHT: 202 LBS | TEMPERATURE: 97.5 F | RESPIRATION RATE: 16 BRPM | DIASTOLIC BLOOD PRESSURE: 67 MMHG | HEIGHT: 62 IN

## 2023-12-07 DIAGNOSIS — G89.18 ACUTE POSTOPERATIVE PAIN: Primary | ICD-10-CM

## 2023-12-07 PROBLEM — M75.102 LEFT ROTATOR CUFF TEAR ARTHROPATHY: Status: ACTIVE | Noted: 2023-12-07

## 2023-12-07 PROBLEM — M12.812 LEFT ROTATOR CUFF TEAR ARTHROPATHY: Status: ACTIVE | Noted: 2023-12-07

## 2023-12-07 PROCEDURE — 7100000000 HC PACU RECOVERY - FIRST 15 MIN: Performed by: ORTHOPAEDIC SURGERY

## 2023-12-07 PROCEDURE — 3600000005 HC SURGERY LEVEL 5 BASE: Performed by: ORTHOPAEDIC SURGERY

## 2023-12-07 PROCEDURE — 2580000003 HC RX 258: Performed by: ORTHOPAEDIC SURGERY

## 2023-12-07 PROCEDURE — 3700000000 HC ANESTHESIA ATTENDED CARE: Performed by: ORTHOPAEDIC SURGERY

## 2023-12-07 PROCEDURE — C1776 JOINT DEVICE (IMPLANTABLE): HCPCS | Performed by: ORTHOPAEDIC SURGERY

## 2023-12-07 PROCEDURE — 97110 THERAPEUTIC EXERCISES: CPT

## 2023-12-07 PROCEDURE — 6370000000 HC RX 637 (ALT 250 FOR IP): Performed by: ORTHOPAEDIC SURGERY

## 2023-12-07 PROCEDURE — 2580000003 HC RX 258: Performed by: NURSE ANESTHETIST, CERTIFIED REGISTERED

## 2023-12-07 PROCEDURE — 97116 GAIT TRAINING THERAPY: CPT

## 2023-12-07 PROCEDURE — 97535 SELF CARE MNGMENT TRAINING: CPT

## 2023-12-07 PROCEDURE — 2709999900 HC NON-CHARGEABLE SUPPLY: Performed by: ORTHOPAEDIC SURGERY

## 2023-12-07 PROCEDURE — 6370000000 HC RX 637 (ALT 250 FOR IP): Performed by: ANESTHESIOLOGY

## 2023-12-07 PROCEDURE — 2500000003 HC RX 250 WO HCPCS: Performed by: NURSE ANESTHETIST, CERTIFIED REGISTERED

## 2023-12-07 PROCEDURE — 97166 OT EVAL MOD COMPLEX 45 MIN: CPT

## 2023-12-07 PROCEDURE — 3700000001 HC ADD 15 MINUTES (ANESTHESIA): Performed by: ORTHOPAEDIC SURGERY

## 2023-12-07 PROCEDURE — 6360000002 HC RX W HCPCS: Performed by: ORTHOPAEDIC SURGERY

## 2023-12-07 PROCEDURE — C1713 ANCHOR/SCREW BN/BN,TIS/BN: HCPCS | Performed by: ORTHOPAEDIC SURGERY

## 2023-12-07 PROCEDURE — 97162 PT EVAL MOD COMPLEX 30 MIN: CPT

## 2023-12-07 PROCEDURE — 6360000002 HC RX W HCPCS: Performed by: ANESTHESIOLOGY

## 2023-12-07 PROCEDURE — 7100000001 HC PACU RECOVERY - ADDTL 15 MIN: Performed by: ORTHOPAEDIC SURGERY

## 2023-12-07 PROCEDURE — 2720000010 HC SURG SUPPLY STERILE: Performed by: ORTHOPAEDIC SURGERY

## 2023-12-07 PROCEDURE — 3600000015 HC SURGERY LEVEL 5 ADDTL 15MIN: Performed by: ORTHOPAEDIC SURGERY

## 2023-12-07 PROCEDURE — 6360000002 HC RX W HCPCS: Performed by: NURSE ANESTHETIST, CERTIFIED REGISTERED

## 2023-12-07 DEVICE — SCREW BNE L25MM DIA4.75MM HD DIA3.5MM CORT FIX ANG: Type: IMPLANTABLE DEVICE | Site: SHOULDER | Status: FUNCTIONAL

## 2023-12-07 DEVICE — BEARING HUM STD 36 MM SHLDR VIVACIT-E PROLONG COMPHSVE: Type: IMPLANTABLE DEVICE | Site: SHOULDER | Status: FUNCTIONAL

## 2023-12-07 DEVICE — SPHERE GLEN DIA36MM REG STD CO CHROM TAPR FIT FOR COMPHSVE: Type: IMPLANTABLE DEVICE | Site: SHOULDER | Status: FUNCTIONAL

## 2023-12-07 DEVICE — HUMERAL TRAY +5 40 MM SHLDR OFFSET COMPHSVE: Type: IMPLANTABLE DEVICE | Site: SHOULDER | Status: FUNCTIONAL

## 2023-12-07 DEVICE — SCREW BNE L35MM DIA4.75MM HD DIA3.5MM CORT FIX ANG: Type: IMPLANTABLE DEVICE | Site: SHOULDER | Status: FUNCTIONAL

## 2023-12-07 DEVICE — SCREW BNE L35MM DIA6.5MM HEX HD DIA3.5MM FOR COMPHSVE CONV: Type: IMPLANTABLE DEVICE | Site: SHOULDER | Status: FUNCTIONAL

## 2023-12-07 DEVICE — SCREW BNE L15MM DIA4.75MM HD DIA3.5MM CORT FIX ANG: Type: IMPLANTABLE DEVICE | Site: SHOULDER | Status: FUNCTIONAL

## 2023-12-07 DEVICE — STEM HUM 12MM MINI SHLDR CO CHROM COMPHSVE REV PRI CEM: Type: IMPLANTABLE DEVICE | Site: SHOULDER | Status: FUNCTIONAL

## 2023-12-07 DEVICE — IMPLANTABLE DEVICE: Type: IMPLANTABLE DEVICE | Site: SHOULDER | Status: FUNCTIONAL

## 2023-12-07 RX ORDER — PHENYLEPHRINE HCL IN 0.9% NACL 1 MG/10 ML
SYRINGE (ML) INTRAVENOUS PRN
Status: DISCONTINUED | OUTPATIENT
Start: 2023-12-07 | End: 2023-12-07 | Stop reason: SDUPTHER

## 2023-12-07 RX ORDER — LEVOTHYROXINE SODIUM 0.05 MG/1
50 TABLET ORAL DAILY
Status: DISCONTINUED | OUTPATIENT
Start: 2023-12-07 | End: 2023-12-07 | Stop reason: HOSPADM

## 2023-12-07 RX ORDER — ONDANSETRON 2 MG/ML
4 INJECTION INTRAMUSCULAR; INTRAVENOUS
Status: DISCONTINUED | OUTPATIENT
Start: 2023-12-07 | End: 2023-12-07 | Stop reason: HOSPADM

## 2023-12-07 RX ORDER — ROCURONIUM BROMIDE 10 MG/ML
INJECTION, SOLUTION INTRAVENOUS PRN
Status: DISCONTINUED | OUTPATIENT
Start: 2023-12-07 | End: 2023-12-07 | Stop reason: SDUPTHER

## 2023-12-07 RX ORDER — BISACODYL 5 MG/1
5 TABLET, DELAYED RELEASE ORAL DAILY
Status: DISCONTINUED | OUTPATIENT
Start: 2023-12-07 | End: 2023-12-07 | Stop reason: HOSPADM

## 2023-12-07 RX ORDER — ONDANSETRON 4 MG/1
4 TABLET, ORALLY DISINTEGRATING ORAL EVERY 8 HOURS PRN
Status: DISCONTINUED | OUTPATIENT
Start: 2023-12-07 | End: 2023-12-07 | Stop reason: HOSPADM

## 2023-12-07 RX ORDER — ONDANSETRON 4 MG/1
4 TABLET, FILM COATED ORAL EVERY 6 HOURS PRN
Qty: 30 TABLET | Refills: 1 | Status: SHIPPED | OUTPATIENT
Start: 2023-12-07

## 2023-12-07 RX ORDER — SODIUM CHLORIDE 0.9 % (FLUSH) 0.9 %
5-40 SYRINGE (ML) INJECTION PRN
Status: DISCONTINUED | OUTPATIENT
Start: 2023-12-07 | End: 2023-12-07 | Stop reason: HOSPADM

## 2023-12-07 RX ORDER — HYDROMORPHONE HYDROCHLORIDE 1 MG/ML
0.5 INJECTION, SOLUTION INTRAMUSCULAR; INTRAVENOUS; SUBCUTANEOUS EVERY 5 MIN PRN
Status: DISCONTINUED | OUTPATIENT
Start: 2023-12-07 | End: 2023-12-07 | Stop reason: HOSPADM

## 2023-12-07 RX ORDER — OXYCODONE HYDROCHLORIDE AND ACETAMINOPHEN 5; 325 MG/1; MG/1
1-2 TABLET ORAL EVERY 4 HOURS PRN
Qty: 50 TABLET | Refills: 0 | Status: SHIPPED | OUTPATIENT
Start: 2023-12-07 | End: 2023-12-14

## 2023-12-07 RX ORDER — HYDROXYZINE HYDROCHLORIDE 10 MG/1
10 TABLET, FILM COATED ORAL EVERY 8 HOURS PRN
Status: DISCONTINUED | OUTPATIENT
Start: 2023-12-07 | End: 2023-12-07 | Stop reason: HOSPADM

## 2023-12-07 RX ORDER — MIDAZOLAM HYDROCHLORIDE 1 MG/ML
INJECTION INTRAMUSCULAR; INTRAVENOUS PRN
Status: DISCONTINUED | OUTPATIENT
Start: 2023-12-07 | End: 2023-12-07 | Stop reason: SDUPTHER

## 2023-12-07 RX ORDER — ACETAMINOPHEN 500 MG
1000 TABLET ORAL ONCE
Status: COMPLETED | OUTPATIENT
Start: 2023-12-07 | End: 2023-12-07

## 2023-12-07 RX ORDER — OXYCODONE HYDROCHLORIDE 5 MG/1
10 TABLET ORAL EVERY 4 HOURS PRN
Status: DISCONTINUED | OUTPATIENT
Start: 2023-12-07 | End: 2023-12-07 | Stop reason: HOSPADM

## 2023-12-07 RX ORDER — LIDOCAINE HYDROCHLORIDE 10 MG/ML
1 INJECTION, SOLUTION EPIDURAL; INFILTRATION; INTRACAUDAL; PERINEURAL
Status: DISCONTINUED | OUTPATIENT
Start: 2023-12-08 | End: 2023-12-07 | Stop reason: HOSPADM

## 2023-12-07 RX ORDER — ONDANSETRON 2 MG/ML
INJECTION INTRAMUSCULAR; INTRAVENOUS PRN
Status: DISCONTINUED | OUTPATIENT
Start: 2023-12-07 | End: 2023-12-07 | Stop reason: SDUPTHER

## 2023-12-07 RX ORDER — TRANEXAMIC ACID 100 MG/ML
INJECTION, SOLUTION INTRAVENOUS PRN
Status: DISCONTINUED | OUTPATIENT
Start: 2023-12-07 | End: 2023-12-07 | Stop reason: SDUPTHER

## 2023-12-07 RX ORDER — GABAPENTIN 300 MG/1
300 CAPSULE ORAL ONCE
Status: DISCONTINUED | OUTPATIENT
Start: 2023-12-07 | End: 2023-12-07 | Stop reason: HOSPADM

## 2023-12-07 RX ORDER — CYCLOBENZAPRINE HCL 10 MG
10 TABLET ORAL EVERY 12 HOURS PRN
Status: DISCONTINUED | OUTPATIENT
Start: 2023-12-07 | End: 2023-12-07 | Stop reason: HOSPADM

## 2023-12-07 RX ORDER — VENLAFAXINE HYDROCHLORIDE 75 MG/1
75 CAPSULE, EXTENDED RELEASE ORAL DAILY
Status: DISCONTINUED | OUTPATIENT
Start: 2023-12-07 | End: 2023-12-07 | Stop reason: HOSPADM

## 2023-12-07 RX ORDER — VANCOMYCIN HYDROCHLORIDE 1 G/20ML
INJECTION, POWDER, LYOPHILIZED, FOR SOLUTION INTRAVENOUS PRN
Status: DISCONTINUED | OUTPATIENT
Start: 2023-12-07 | End: 2023-12-07 | Stop reason: ALTCHOICE

## 2023-12-07 RX ORDER — DOCUSATE SODIUM 100 MG/1
100 CAPSULE, LIQUID FILLED ORAL 2 TIMES DAILY
Qty: 30 CAPSULE | Refills: 0 | Status: SHIPPED | OUTPATIENT
Start: 2023-12-07

## 2023-12-07 RX ORDER — BUPROPION HYDROCHLORIDE 150 MG/1
150 TABLET ORAL EVERY MORNING
Status: DISCONTINUED | OUTPATIENT
Start: 2023-12-08 | End: 2023-12-07 | Stop reason: HOSPADM

## 2023-12-07 RX ORDER — FENTANYL CITRATE 50 UG/ML
INJECTION, SOLUTION INTRAMUSCULAR; INTRAVENOUS PRN
Status: DISCONTINUED | OUTPATIENT
Start: 2023-12-07 | End: 2023-12-07 | Stop reason: SDUPTHER

## 2023-12-07 RX ORDER — SODIUM CHLORIDE 9 MG/ML
INJECTION, SOLUTION INTRAVENOUS PRN
Status: DISCONTINUED | OUTPATIENT
Start: 2023-12-07 | End: 2023-12-07 | Stop reason: HOSPADM

## 2023-12-07 RX ORDER — SODIUM CHLORIDE, SODIUM LACTATE, POTASSIUM CHLORIDE, CALCIUM CHLORIDE 600; 310; 30; 20 MG/100ML; MG/100ML; MG/100ML; MG/100ML
INJECTION, SOLUTION INTRAVENOUS CONTINUOUS
Status: DISCONTINUED | OUTPATIENT
Start: 2023-12-07 | End: 2023-12-07

## 2023-12-07 RX ORDER — ALBUTEROL SULFATE 90 UG/1
2 AEROSOL, METERED RESPIRATORY (INHALATION) EVERY 4 HOURS PRN
Status: DISCONTINUED | OUTPATIENT
Start: 2023-12-07 | End: 2023-12-07 | Stop reason: HOSPADM

## 2023-12-07 RX ORDER — DEXAMETHASONE SODIUM PHOSPHATE 10 MG/ML
INJECTION, SOLUTION INTRAMUSCULAR; INTRAVENOUS PRN
Status: DISCONTINUED | OUTPATIENT
Start: 2023-12-07 | End: 2023-12-07 | Stop reason: SDUPTHER

## 2023-12-07 RX ORDER — ACETAMINOPHEN 325 MG/1
650 TABLET ORAL EVERY 6 HOURS
Status: DISCONTINUED | OUTPATIENT
Start: 2023-12-07 | End: 2023-12-07 | Stop reason: HOSPADM

## 2023-12-07 RX ORDER — SODIUM CHLORIDE 9 MG/ML
INJECTION, SOLUTION INTRAVENOUS CONTINUOUS
Status: DISCONTINUED | OUTPATIENT
Start: 2023-12-07 | End: 2023-12-07

## 2023-12-07 RX ORDER — PANTOPRAZOLE SODIUM 40 MG/1
40 TABLET, DELAYED RELEASE ORAL
Status: DISCONTINUED | OUTPATIENT
Start: 2023-12-08 | End: 2023-12-07 | Stop reason: HOSPADM

## 2023-12-07 RX ORDER — FUROSEMIDE 20 MG/1
20 TABLET ORAL DAILY PRN
Status: DISCONTINUED | OUTPATIENT
Start: 2023-12-07 | End: 2023-12-07 | Stop reason: HOSPADM

## 2023-12-07 RX ORDER — SODIUM CHLORIDE, SODIUM LACTATE, POTASSIUM CHLORIDE, CALCIUM CHLORIDE 600; 310; 30; 20 MG/100ML; MG/100ML; MG/100ML; MG/100ML
INJECTION, SOLUTION INTRAVENOUS CONTINUOUS PRN
Status: DISCONTINUED | OUTPATIENT
Start: 2023-12-07 | End: 2023-12-07 | Stop reason: SDUPTHER

## 2023-12-07 RX ORDER — MONTELUKAST SODIUM 10 MG/1
10 TABLET ORAL NIGHTLY
Status: DISCONTINUED | OUTPATIENT
Start: 2023-12-07 | End: 2023-12-07 | Stop reason: HOSPADM

## 2023-12-07 RX ORDER — ONDANSETRON 2 MG/ML
4 INJECTION INTRAMUSCULAR; INTRAVENOUS EVERY 6 HOURS PRN
Status: DISCONTINUED | OUTPATIENT
Start: 2023-12-07 | End: 2023-12-07 | Stop reason: HOSPADM

## 2023-12-07 RX ORDER — ASCORBIC ACID 500 MG
1000 TABLET ORAL DAILY
Status: DISCONTINUED | OUTPATIENT
Start: 2023-12-07 | End: 2023-12-07 | Stop reason: HOSPADM

## 2023-12-07 RX ORDER — METOPROLOL SUCCINATE 50 MG/1
50 TABLET, EXTENDED RELEASE ORAL DAILY
Status: DISCONTINUED | OUTPATIENT
Start: 2023-12-07 | End: 2023-12-07 | Stop reason: HOSPADM

## 2023-12-07 RX ORDER — FOLIC ACID 1 MG/1
1 TABLET ORAL DAILY
Status: DISCONTINUED | OUTPATIENT
Start: 2023-12-07 | End: 2023-12-07 | Stop reason: HOSPADM

## 2023-12-07 RX ORDER — FENTANYL CITRATE 50 UG/ML
25 INJECTION, SOLUTION INTRAMUSCULAR; INTRAVENOUS EVERY 5 MIN PRN
Status: DISCONTINUED | OUTPATIENT
Start: 2023-12-07 | End: 2023-12-07 | Stop reason: HOSPADM

## 2023-12-07 RX ORDER — PROPOFOL 10 MG/ML
INJECTION, EMULSION INTRAVENOUS PRN
Status: DISCONTINUED | OUTPATIENT
Start: 2023-12-07 | End: 2023-12-07 | Stop reason: SDUPTHER

## 2023-12-07 RX ORDER — LOSARTAN POTASSIUM 100 MG/1
100 TABLET ORAL DAILY
Status: DISCONTINUED | OUTPATIENT
Start: 2023-12-07 | End: 2023-12-07 | Stop reason: HOSPADM

## 2023-12-07 RX ORDER — ATORVASTATIN CALCIUM 40 MG/1
40 TABLET, FILM COATED ORAL DAILY
Status: DISCONTINUED | OUTPATIENT
Start: 2023-12-07 | End: 2023-12-07 | Stop reason: HOSPADM

## 2023-12-07 RX ORDER — FLUTICASONE FUROATE, UMECLIDINIUM BROMIDE AND VILANTEROL TRIFENATATE 200; 62.5; 25 UG/1; UG/1; UG/1
1 POWDER RESPIRATORY (INHALATION)
COMMUNITY
Start: 2023-09-20

## 2023-12-07 RX ORDER — SODIUM CHLORIDE 9 MG/ML
INJECTION, SOLUTION INTRAVENOUS CONTINUOUS
Status: DISCONTINUED | OUTPATIENT
Start: 2023-12-07 | End: 2023-12-07 | Stop reason: HOSPADM

## 2023-12-07 RX ORDER — SODIUM CHLORIDE 0.9 % (FLUSH) 0.9 %
5-40 SYRINGE (ML) INJECTION EVERY 12 HOURS SCHEDULED
Status: DISCONTINUED | OUTPATIENT
Start: 2023-12-07 | End: 2023-12-07 | Stop reason: HOSPADM

## 2023-12-07 RX ORDER — CELECOXIB 200 MG/1
200 CAPSULE ORAL ONCE
Status: COMPLETED | OUTPATIENT
Start: 2023-12-07 | End: 2023-12-07

## 2023-12-07 RX ORDER — OXYCODONE HYDROCHLORIDE 5 MG/1
5 TABLET ORAL EVERY 4 HOURS PRN
Status: DISCONTINUED | OUTPATIENT
Start: 2023-12-07 | End: 2023-12-07 | Stop reason: HOSPADM

## 2023-12-07 RX ORDER — POTASSIUM CHLORIDE 1.5 G/1.58G
20 POWDER, FOR SOLUTION ORAL DAILY PRN
Status: DISCONTINUED | OUTPATIENT
Start: 2023-12-07 | End: 2023-12-07 | Stop reason: HOSPADM

## 2023-12-07 RX ADMIN — SODIUM CHLORIDE, POTASSIUM CHLORIDE, SODIUM LACTATE AND CALCIUM CHLORIDE: 600; 310; 30; 20 INJECTION, SOLUTION INTRAVENOUS at 11:39

## 2023-12-07 RX ADMIN — DEXAMETHASONE SODIUM PHOSPHATE 10 MG: 10 INJECTION, SOLUTION INTRAMUSCULAR; INTRAVENOUS at 11:53

## 2023-12-07 RX ADMIN — FENTANYL CITRATE 50 MCG: 50 INJECTION, SOLUTION INTRAMUSCULAR; INTRAVENOUS at 14:38

## 2023-12-07 RX ADMIN — Medication 100 MCG: at 12:16

## 2023-12-07 RX ADMIN — CELECOXIB 200 MG: 200 CAPSULE ORAL at 10:56

## 2023-12-07 RX ADMIN — ROCURONIUM BROMIDE 50 MG: 10 INJECTION, SOLUTION INTRAVENOUS at 11:45

## 2023-12-07 RX ADMIN — MIDAZOLAM 2 MG: 1 INJECTION INTRAMUSCULAR; INTRAVENOUS at 11:39

## 2023-12-07 RX ADMIN — FENTANYL CITRATE 100 MCG: 50 INJECTION, SOLUTION INTRAMUSCULAR; INTRAVENOUS at 11:45

## 2023-12-07 RX ADMIN — TRANEXAMIC ACID 1000 MG: 100 INJECTION, SOLUTION INTRAVENOUS at 13:50

## 2023-12-07 RX ADMIN — SODIUM CHLORIDE: 9 INJECTION, SOLUTION INTRAVENOUS at 16:19

## 2023-12-07 RX ADMIN — Medication 100 MCG: at 12:50

## 2023-12-07 RX ADMIN — Medication 100 MCG: at 12:25

## 2023-12-07 RX ADMIN — TRANEXAMIC ACID 1000 MG: 100 INJECTION, SOLUTION INTRAVENOUS at 11:56

## 2023-12-07 RX ADMIN — ACETAMINOPHEN 1000 MG: 500 TABLET ORAL at 10:55

## 2023-12-07 RX ADMIN — SUGAMMADEX 200 MG: 100 INJECTION, SOLUTION INTRAVENOUS at 14:13

## 2023-12-07 RX ADMIN — SODIUM CHLORIDE, POTASSIUM CHLORIDE, SODIUM LACTATE AND CALCIUM CHLORIDE: 600; 310; 30; 20 INJECTION, SOLUTION INTRAVENOUS at 13:39

## 2023-12-07 RX ADMIN — FENTANYL CITRATE 25 MCG: 50 INJECTION INTRAMUSCULAR; INTRAVENOUS at 15:19

## 2023-12-07 RX ADMIN — HYDROMORPHONE HYDROCHLORIDE 0.5 MG: 1 INJECTION, SOLUTION INTRAMUSCULAR; INTRAVENOUS; SUBCUTANEOUS at 15:04

## 2023-12-07 RX ADMIN — FENTANYL CITRATE 50 MCG: 50 INJECTION, SOLUTION INTRAMUSCULAR; INTRAVENOUS at 12:32

## 2023-12-07 RX ADMIN — Medication 100 MCG: at 11:56

## 2023-12-07 RX ADMIN — Medication 100 MCG: at 12:10

## 2023-12-07 RX ADMIN — Medication 200 MCG: at 11:57

## 2023-12-07 RX ADMIN — OXYCODONE HYDROCHLORIDE 10 MG: 5 TABLET ORAL at 18:37

## 2023-12-07 RX ADMIN — PROPOFOL 200 MG: 10 INJECTION, EMULSION INTRAVENOUS at 11:45

## 2023-12-07 RX ADMIN — SODIUM CHLORIDE, POTASSIUM CHLORIDE, SODIUM LACTATE AND CALCIUM CHLORIDE: 600; 310; 30; 20 INJECTION, SOLUTION INTRAVENOUS at 14:26

## 2023-12-07 RX ADMIN — HYDROMORPHONE HYDROCHLORIDE 0.5 MG: 1 INJECTION, SOLUTION INTRAMUSCULAR; INTRAVENOUS; SUBCUTANEOUS at 14:54

## 2023-12-07 RX ADMIN — Medication 2000 MG: at 11:55

## 2023-12-07 RX ADMIN — ONDANSETRON 4 MG: 2 INJECTION INTRAMUSCULAR; INTRAVENOUS at 13:39

## 2023-12-07 ASSESSMENT — PAIN SCALES - GENERAL
PAINLEVEL_OUTOF10: 7
PAINLEVEL_OUTOF10: 4
PAINLEVEL_OUTOF10: 8
PAINLEVEL_OUTOF10: 5
PAINLEVEL_OUTOF10: 4
PAINLEVEL_OUTOF10: 8

## 2023-12-07 ASSESSMENT — PAIN DESCRIPTION - DESCRIPTORS
DESCRIPTORS: ACHING;SORE
DESCRIPTORS: ACHING;SORE

## 2023-12-07 ASSESSMENT — PAIN DESCRIPTION - ORIENTATION
ORIENTATION: LEFT
ORIENTATION: LEFT

## 2023-12-07 ASSESSMENT — PAIN DESCRIPTION - PAIN TYPE
TYPE: SURGICAL PAIN
TYPE: SURGICAL PAIN

## 2023-12-07 ASSESSMENT — PAIN DESCRIPTION - FREQUENCY: FREQUENCY: CONTINUOUS

## 2023-12-07 ASSESSMENT — PAIN DESCRIPTION - ONSET: ONSET: ON-GOING

## 2023-12-07 ASSESSMENT — PAIN DESCRIPTION - LOCATION
LOCATION: SHOULDER
LOCATION: SHOULDER

## 2023-12-07 ASSESSMENT — PAIN - FUNCTIONAL ASSESSMENT: PAIN_FUNCTIONAL_ASSESSMENT: 0-10

## 2023-12-07 NOTE — ANESTHESIA PRE PROCEDURE
Department of Anesthesiology  Preprocedure Note       Name:  Celio Cross   Age:  70 y.o.  :  1955                                          MRN:  [id number]         Date:  2023      Surgeon: Logan Sosa):  Darylene Given, MD    Procedure: Procedure(s):  SHOULDER TOTAL ARTHROPLASTY REVERSE    Medications prior to admission:   Prior to Admission medications    Medication Sig Start Date End Date Taking? Authorizing Provider   acetaminophen (TYLENOL) 500 MG tablet Take 2 tablets by mouth every 8 hours as needed for Pain    Srinivasan Valdez MD   vitamin C (ASCORBIC ACID) 500 MG tablet Take 2 tablets by mouth daily    Srinivasan Valdez MD   atorvastatin (LIPITOR) 40 MG tablet Take 1 tablet by mouth daily    Srinivasan Valdez MD   calcium carbonate (OSCAL) 500 MG TABS tablet Take 1 tablet by mouth daily    Srinivasan Valdez MD   vitamin B-12 (CYANOCOBALAMIN) 1000 MCG tablet Take 1 tablet by mouth daily    Srinivasan Valdez MD   folic acid (FOLVITE) 1 MG tablet Take 1 tablet by mouth daily    Srinivasan Valdez MD   potassium chloride (KLOR-CON) 20 MEQ packet Take 20 mEq by mouth daily as needed    Srinivasan Valdez MD   metoprolol succinate (TOPROL XL) 50 MG extended release tablet Take 1 tablet by mouth daily    Srinivasan Valdez MD   montelukast (SINGULAIR) 10 MG tablet Take 1 tablet by mouth nightly    Srinivasan Valdez MD   olmesartan (BENICAR) 40 MG tablet Take 1 tablet by mouth daily    Srinivasan Valdez MD   apixaban (ELIQUIS) 5 MG TABS tablet Take 1 tablet by mouth 2 times daily    Srinivasan Valdez MD   oxyCODONE-acetaminophen (PERCOCET) 5-325 MG per tablet Take 1 tablet by mouth every 4 hours as needed for Pain.     Srinivasan Valdez MD   venlafaxine (EFFEXOR XR) 75 MG extended release capsule Take 1 capsule by mouth daily 8/3/21   Srinivasan Valdez MD   buPROPion (WELLBUTRIN XL) 150 MG extended release tablet Take 1 tablet by mouth every morning
Angel Magallanes

## 2023-12-07 NOTE — H&P
History and Physical Update    Pt Name: Yordy Michaud  MRN: [id number]  YOB: 1955  Date of evaluation: 12/7/2023    [x] I have examined the patient and reviewed the H&P/Consult and there are no changes to the patient or plans.     [] I have examined the patient and reviewed the H&P/Consult and have noted the following changes:        Grey Irene MD   Electronically signed 12/7/2023 at 10:18 AM

## 2023-12-07 NOTE — PROGRESS NOTES
Physical Therapy  Facility/Department: [facility] MED SURG  Physical Therapy Initial Assessment-day of surgery    Name: Celio Cross  : 1955  MRN: [id number]  Date of Service: 2023    Discharge Recommendations:    Pt presenting with new musculoskeletal dysfunction and would benefit from additional therapy at time of discharge. Please refer to the AM-PAC score for current functional status. L rTSA by Dr. Cb Long 23      Patient Diagnosis(es): The encounter diagnosis was Acute postoperative pain. Past Medical History:  has a past medical history of A-fib ([address]), Asthma, COPD (chronic obstructive pulmonary disease) ([address]), Depression, Diverticulitis, GERD (gastroesophageal reflux disease), Hyperlipidemia, Murmur, cardiac, Thyroid disease, and Umbilical hernia. Past Surgical History:  has a past surgical history that includes Hysterectomy; Breast surgery (Bilateral); shoulder surgery (Right); Cardiac surgery; bladder suspension; Breast biopsy (Left); Tonsillectomy; Colonoscopy; Cardioversion; and Total knee arthroplasty (Right, 11/15/2021). Assessment   Body Structures, Functions, Activity Limitations Requiring Skilled Therapeutic Intervention: Decreased functional mobility ; Decreased ROM; Decreased strength;Decreased balance  Assessment: Pt. seen day of surgery. Reviewed UE ROM and brace donning/doffing. Amb. around bed only due to pt. being very groggy and slightly lightheaded. BP WNL. Specific Instructions for Next Treatment: Pt. may d/c home day of surgery. If spending night, address goals. Therapy Prognosis: Excellent  Decision Making: Medium Complexity  Requires PT Follow-Up: Yes  Activity Tolerance  Activity Tolerance: Patient limited by fatigue  Activity Tolerance Comments: very groggy/ somewhat lightheaded; BP WNL     Plan   Physical Therapy Plan  General Plan: 2 times a day 7 days a week  Specific Instructions for Next Treatment: Pt. may d/c home day of surgery.  If spending night, address

## 2023-12-07 NOTE — OP NOTE
Operative Note      Patient: Osmar Robins  YOB: 1955  MRN: [id number]    Date of Procedure: 12/7/2023    Pre-Op Diagnosis Codes:     * Left rotator cuff tear arthropathy [M75.102, M12.812]    Post-Op Diagnosis: Same       Procedure(s):  SHOULDER TOTAL ARTHROPLASTY REVERSE SUPRASCAPULAR NERVE BLOCK    Surgeon(s):  Zac Resendiz MD    Assistant:   Resident: Charlcie Burkitt, DO    Anesthesia: General    Estimated Blood Loss (mL): Minimal    Complications: Multiple skin tears    Specimens:   * No specimens in log *    Implants:  Implant Name Type Inv. Item Serial No.  Lot No. LRB No. Used Action   BASEPLATE AUGUSTO [id number] MINI SHLDR REV TAPR COMPHSVE - [id number]  BASEPLATE AUGUSTO [id number] MINI SHLDR REV TAPR [facility] [employer]- [id number] Left 1 Implanted   SCREW BNE L35MM DIA6. 5MM HEX HD DIA3. 5MM FOR COMPHSVE CONV - [id number]  SCREW BNE L35MM DIA6. 5MM HEX HD DIA3. 5MM FOR COMPHSVE CONV  [employer]- [id number] Left 1 Implanted   SCREW BNE L35MM DIA4. 75MM HD DIA3.5MM ROSAURA FIX ANG - [id number]  SCREW BNE L35MM DIA4. 75MM HD DIA3.5MM [employer]A FIX ANG  [employer]- [id number] Left 1 Implanted   SPHERE AUGUSTO [id number] REG STD CO CHROM TAPR FIT FOR COMPHSVE - [id number]  SPHERE AUGUSTO [id number] REG STD CO CHROM TAPR FIT FOR COMPHSVE  [employer]- [id number] Left 1 Implanted   SCREW BNE L15MM DIA4. 75MM HD DIA3.5MM ROSAURA FIX ANG - [id number]  SCREW BNE L15MM DIA4. 75MM HD DIA3.5MM [employer]A FIX ANG  [facility] [id number] Left 1 Implanted   SCREW BNE L15MM DIA4. 75MM HD DIA3.5MM ROSAURA FIX ANG - [id number]  SCREW BNE L15MM DIA4. 75MM HD DIA3.5MM [employer]A FIX ANG  [facility] [id number] Left 1 Implanted   SCREW BNE L25MM DIA4. 75MM HD DIA3.5MM ROSAURA FIX ANG - [id number]  SCREW BNE L25MM DIA4. 75MM HD DIA3.5MM ROSAURA FIX ANG  [employer]-WD [id number] Left 1 Implanted   STEM HUM 12MM MINI SHLDR CO CHROM COMPHSVE REV KARAN RAUL - [id number]

## 2023-12-07 NOTE — ANESTHESIA POSTPROCEDURE EVALUATION
Department of Anesthesiology  Postprocedure Note    Patient: Thuy Vuong  MRN: [id number]  YOB: 1955  Date of evaluation: 12/7/2023      Procedure Summary       Date: 12/07/23 Room / Location: [address] - INPATIENT    Anesthesia Start: 8555 Anesthesia Stop: 430    Procedure: SHOULDER TOTAL ARTHROPLASTY REVERSE SUPRASCAPULAR NERVE BLOCK (Left: Shoulder) Diagnosis:       Left rotator cuff tear arthropathy      (Left rotator cuff tear arthropathy [M75.102, M12.812])    Surgeons: Nav Preciado MD Responsible Provider: Tanja Tarango MD    Anesthesia Type: General ASA Status: 3            Anesthesia Type: General    Paulina Phase I: Paulina Score: 10    Paulina Phase II:        Anesthesia Post Evaluation    Patient location during evaluation: PACU  Patient participation: complete - patient participated  Level of consciousness: awake  Airway patency: patent  Nausea & Vomiting: no nausea  Complications: no  Cardiovascular status: blood pressure returned to baseline  Respiratory status: acceptable  Hydration status: euvolemic  Comments: Multimodal analgesia pain management as indicated by procedure  Multimodal analgesia pain management approach  Pain management: adequate

## 2023-12-08 NOTE — PLAN OF CARE
Problem: Discharge Planning  Goal: Discharge to home or other facility with appropriate resources  Outcome: Adequate for Discharge  Flowsheets (Taken 12/7/2023 7995)  Discharge to home or other facility with appropriate resources: Identify barriers to discharge with patient and caregiver     Problem: Pain  Goal: Verbalizes/displays adequate comfort level or baseline comfort level  Outcome: Adequate for Discharge     Problem: Skin/Tissue Integrity - Adult  Goal: Skin integrity remains intact  Outcome: Adequate for Discharge     Problem: Musculoskeletal - Adult  Goal: Return mobility to safest level of function  Outcome: Adequate for Discharge

## 2023-12-08 NOTE — PROGRESS NOTES
Total joint patient has met the below criteria for a safe same day discharge. Patient has voided before discharge. PT has cleared pt for safety before discharge. Pain controlled with PO pain meds. Pt able to eat without nausea and vomiting. DME delivered to bedside before discharge (if needed please specify which DME). RXs filled and delivered to bedside by [facility]. Incentive spirometer/C&DB used correctly and sent home with pt. VSS/MAP WNL.

## 2023-12-08 NOTE — PROGRESS NOTES
Occupational Therapy  Facility/Department: [address] [place] [address]  Occupational Therapy Initial Assessment    Name: Darya Thacker  : 1955  MRN: [id number]  Date of Service: 2023    ALEX MARSHALL, 2023    RN reports patient is medically stable for therapy treatment this date. Chart reviewed prior to treatment and patient is agreeable for therapy. All lines intact and patient positioned comfortably at end of treatment. All patient needs addressed prior to ending therapy session. Patient Diagnosis(es): The encounter diagnosis was Acute postoperative pain. Past Medical History:  has a past medical history of A-fib ([address]), Asthma, COPD (chronic obstructive pulmonary disease) ([address]), Depression, Diverticulitis, GERD (gastroesophageal reflux disease), Hyperlipidemia, Murmur, cardiac, Thyroid disease, and Umbilical hernia. Past Surgical History:  has a past surgical history that includes Hysterectomy; Breast surgery (Bilateral); shoulder surgery (Right); Cardiac surgery; bladder suspension; Breast biopsy (Left); Tonsillectomy; Colonoscopy; Cardioversion; and Total knee arthroplasty (Right, 11/15/2021). Assessment   Performance deficits / Impairments: Decreased functional mobility ; Decreased ADL status; Decreased endurance;Decreased balance;Decreased posture;Decreased strength;Decreased ROM  Assessment: pt will be safe for d/c home once medically appropriate.  Therapy goals met  Prognosis: Good  Decision Making: Medium Complexity  REQUIRES OT FOLLOW-UP: No  Activity Tolerance  Activity Tolerance: Patient Tolerated treatment well;Patient limited by fatigue        Plan   Occupational Therapy Plan  Times Per Week: 1 visit with eval  Current Treatment Recommendations: Strengthening, Balance training, Functional mobility training, Self-Care / ADL, Safety education & training, Endurance training, Patient/Caregiver education & training, Equipment evaluation, education, & procurement, Positioning

## (undated) DEVICE — GARMENT,MEDLINE,DVT,INT,CALF,MED, GEN2: Brand: MEDLINE

## (undated) DEVICE — SYRINGE MED 30ML STD CLR PLAS LUERLOCK TIP N CTRL DISP

## (undated) DEVICE — GLOVE SURG SZ 8 CRM LTX FREE POLYISOPRENE POLYMER BEAD ANTI

## (undated) DEVICE — SUTURE VCRL 0 L27IN ABSRB OS-6 BRAID COAT UD J534H

## (undated) DEVICE — STRYKER PERFORMANCE SERIES SAGITTAL BLADE: Brand: STRYKER PERFORMANCE SERIES

## (undated) DEVICE — BLADE SAW W12.5XL70MM THK0.8MM CUT THK1.12MM S STL RECIP

## (undated) DEVICE — DRESSING,GAUZE,XEROFORM,CURAD,5"X9",ST: Brand: CURAD

## (undated) DEVICE — ZIMMER® STERILE DISPOSABLE TOURNIQUET CUFF WITH PLC, DUAL PORT, SINGLE BLADDER, 34 IN. (86 CM)

## (undated) DEVICE — PAD COOL W10.9XL11.3IN UNIV REG HOSE WRP ON THER CLD

## (undated) DEVICE — SUTURE ETHBND EXCEL SZ 5 L30IN NONABSORBABLE GRN L48MM CCS MB47G

## (undated) DEVICE — 4-PORT MANIFOLD: Brand: NEPTUNE 2

## (undated) DEVICE — PREP-RESISTANT MARKER W/ RULER: Brand: MEDLINE INDUSTRIES, INC.

## (undated) DEVICE — BANDAGE,GAUZE,BULKEE II,4.5"X4.1YD,STRL: Brand: MEDLINE

## (undated) DEVICE — INTENDED FOR TISSUE SEPARATION, AND OTHER PROCEDURES THAT REQUIRE A SHARP SURGICAL BLADE TO PUNCTURE OR CUT.: Brand: BARD-PARKER ® CARBON RIB-BACK BLADES

## (undated) DEVICE — BLADE,CARBON-STEEL,15,STRL,DISPOSABLE,TB: Brand: MEDLINE

## (undated) DEVICE — 3M™ IOBAN™ 2 ANTIMICROBIAL INCISE DRAPE 6650EZ: Brand: IOBAN™ 2

## (undated) DEVICE — PAD,NON-ADHERENT,3X8,STERILE,LF,1/PK: Brand: MEDLINE

## (undated) DEVICE — SUTURE MCRYL SZ 3-0 L27IN ABSRB UD L24MM PS-1 3/8 CIR PRIM Y936H

## (undated) DEVICE — POSITIONER HD W8XH4XL8.5IN RASPBERRY FOAM SLT

## (undated) DEVICE — DRESSING HYDROFIBER AQUACEL AG ADVANTAGE 3.5X10 IN

## (undated) DEVICE — SILVER-COATED ANTIMICROBIAL BARRIER DRESSING: Brand: ACTICOAT FLEX3 4" X 4"

## (undated) DEVICE — SUTURE STRATAFIX SPRL MCRYL + 3 0 SGL ARMED PS 1 24 IN LEN SXMP1B103

## (undated) DEVICE — DRAPE,U/ SHT,SPLIT,PLAS,STERIL: Brand: MEDLINE

## (undated) DEVICE — DECANTER FLD 9IN ST BG FOR ASEP TRNSF OF FLD

## (undated) DEVICE — ZIPPERED TOGA, 2X LARGE: Brand: FLYTE, SURGICOOL

## (undated) DEVICE — Device

## (undated) DEVICE — BLANKET WRM W29.9XL79.1IN UP BODY FORC AIR MISTRAL-AIR

## (undated) DEVICE — 450 ML BOTTLE OF 0.05% CHLORHEXIDINE GLUCONATE IN 99.95% STERILE WATER FOR IRRIGATION, USP AND APPLICATOR.: Brand: IRRISEPT ANTIMICROBIAL WOUND LAVAGE

## (undated) DEVICE — SOLUTION IRRIG 3000ML 0.9% SOD CHL USP UROMATIC PLAS CONT

## (undated) DEVICE — NEEDLE SPNL 18GA L3.5IN W/ QNCKE SHARPER BVL DURA CLICK

## (undated) DEVICE — GUIDEPIN ORTH THRD HI PERF HD SIG

## (undated) DEVICE — BLADE ES L6IN ELASTOMERIC COAT EXT DURABLE BEND UPTO 90DEG

## (undated) DEVICE — COVER,MAYO STAND,XL,STERILE: Brand: MEDLINE

## (undated) DEVICE — LIQUIBAND RAPID ADHESIVE 36/CS 0.8ML: Brand: MEDLINE

## (undated) DEVICE — PAD,ABDOMINAL,5"X9",ST,LF,25/BX: Brand: MEDLINE INDUSTRIES, INC.

## (undated) DEVICE — BANDAGE COBAN 4 IN COMPR W4INXL5YD FOAM COHESIVE QUIK STK SELF ADH SFT

## (undated) DEVICE — GLOVE SURG SZ 8 L12IN FNGR THK79MIL GRN LTX FREE

## (undated) DEVICE — SOLUTION ANSEP 3% PEROXIDE 8OZ TOP NS LF

## (undated) DEVICE — SUTURE NONABSORBABLE MONOFILAMENT 2-0 FS 18 IN ETHILON 664H

## (undated) DEVICE — CONTAINER,SPECIMEN,OR STERILE,4OZ: Brand: MEDLINE

## (undated) DEVICE — PIN DRL THRD HDLSS SIG

## (undated) DEVICE — SUTURE VCRL SZ 2-0 L36IN ABSRB UD L36MM CT-1 1/2 CIR J945H

## (undated) DEVICE — PICO 7 10CM X 30CM: Brand: PICO™ 7

## (undated) DEVICE — SPONGE,PEANUT,XRAY,ST,SM,3/8",5/CARD: Brand: MEDLINE INDUSTRIES, INC.

## (undated) DEVICE — TOWEL,OR,DSP,ST,BLUE,DLX,XR,4/PK,20PK/CS: Brand: MEDLINE

## (undated) DEVICE — SUTURE STRATAFIX SYMMETRIC PDS + SZ 1 L18IN ABSRB VLT L48MM SXPP1A400

## (undated) DEVICE — SUTURE FIBERWIRE SZ 2 W/ TAPERED NEEDLE BLUE L38IN NONABSORB BLU L26.5MM 1/2 CIRCLE AR7200

## (undated) DEVICE — BLANKET WRM W40.2XL55.9IN IORT LO BODY + MISTRAL AIR

## (undated) DEVICE — SUTURE 2-0 STRATAFIX MONOCRYL 45CM CT-1

## (undated) DEVICE — SST TWIST DRILL, AO TYPE, 2MM DIA. X 75MM: Brand: MICROAIRE®

## (undated) DEVICE — BLADE SAGITAL 18X90X1.27MM

## (undated) DEVICE — NEEDLE, QUINCKE, 18GX3.5": Brand: MEDLINE

## (undated) DEVICE — GAUZE,SPONGE,FLUFF,6"X6.75",STRL,5/TRAY: Brand: MEDLINE

## (undated) DEVICE — PIN FIX L60MM DIA3MM STD S STL THRD SGL SHRP FOR HUM RESECT
Type: IMPLANTABLE DEVICE | Site: SHOULDER | Status: NON-FUNCTIONAL
Removed: 2023-12-07

## (undated) DEVICE — SUTURE VCRL SZ 2-0 L27IN ABSRB UD L36MM CP-1 1/2 CIR REV J266H

## (undated) DEVICE — SUTURE ABSORBABLE BRAIDED 2-0 CT-1 27 IN UD VICRYL J259H

## (undated) DEVICE — PROTECTOR EYE PT SELF ADH NS OPT GRD LF

## (undated) DEVICE — SOLUTION IV 1000ML 0.9% SOD CHL PH 5 INJ USP VIAFLX PLAS

## (undated) DEVICE — BIT DRL DIA2.7MM PERIPH SCR REUSE FOR COMPHSVE REV SHLDR

## (undated) DEVICE — STRIP,CLOSURE,WOUND,MEDI-STRIP,1/2X4: Brand: MEDLINE

## (undated) DEVICE — ZIPPERED TOGA, X-LARGE: Brand: FLYTE

## (undated) DEVICE — GLOVE SURG SZ 75 CRM LTX FREE POLYISOPRENE POLYMER BEAD ANTI